# Patient Record
Sex: FEMALE | Race: WHITE | NOT HISPANIC OR LATINO | Employment: FULL TIME | RURAL
[De-identification: names, ages, dates, MRNs, and addresses within clinical notes are randomized per-mention and may not be internally consistent; named-entity substitution may affect disease eponyms.]

---

## 2018-07-19 ENCOUNTER — HISTORICAL (OUTPATIENT)
Dept: ADMINISTRATIVE | Facility: HOSPITAL | Age: 48
End: 2018-07-19

## 2018-07-20 LAB
LAB AP CLINICAL INFORMATION: NORMAL
LAB AP DIAGNOSIS - HISTORICAL: NORMAL
LAB AP GROSS PATHOLOGY - HISTORICAL: NORMAL
LAB AP SPECIMEN SUBMITTED - HISTORICAL: NORMAL

## 2019-01-16 ENCOUNTER — HISTORICAL (OUTPATIENT)
Dept: ADMINISTRATIVE | Facility: HOSPITAL | Age: 49
End: 2019-01-16

## 2019-01-18 LAB
LAB AP CLINICAL INFORMATION: NORMAL
LAB AP GENERAL CAT - HISTORICAL: NORMAL
LAB AP INTERPRETATION/RESULT - HISTORICAL: NEGATIVE
LAB AP SPECIMEN ADEQUACY - HISTORICAL: NORMAL
LAB AP SPECIMEN SUBMITTED - HISTORICAL: NORMAL

## 2019-07-15 ENCOUNTER — HISTORICAL (OUTPATIENT)
Dept: ADMINISTRATIVE | Facility: HOSPITAL | Age: 49
End: 2019-07-15

## 2020-03-09 ENCOUNTER — HISTORICAL (OUTPATIENT)
Dept: ADMINISTRATIVE | Facility: HOSPITAL | Age: 50
End: 2020-03-09

## 2020-09-07 ENCOUNTER — HISTORICAL (OUTPATIENT)
Dept: ADMINISTRATIVE | Facility: HOSPITAL | Age: 50
End: 2020-09-07

## 2020-09-25 ENCOUNTER — HISTORICAL (OUTPATIENT)
Dept: ADMINISTRATIVE | Facility: HOSPITAL | Age: 50
End: 2020-09-25

## 2020-09-25 LAB — SARS-COV+SARS-COV-2 AG RESP QL IA.RAPID: NEGATIVE

## 2022-02-17 ENCOUNTER — OFFICE VISIT (OUTPATIENT)
Dept: FAMILY MEDICINE | Facility: CLINIC | Age: 52
End: 2022-02-17
Payer: OTHER GOVERNMENT

## 2022-02-17 VITALS
HEART RATE: 88 BPM | DIASTOLIC BLOOD PRESSURE: 64 MMHG | RESPIRATION RATE: 18 BRPM | OXYGEN SATURATION: 99 % | HEIGHT: 70 IN | BODY MASS INDEX: 27.2 KG/M2 | WEIGHT: 190 LBS | SYSTOLIC BLOOD PRESSURE: 122 MMHG | TEMPERATURE: 97 F

## 2022-02-17 DIAGNOSIS — R09.81 HEAD CONGESTION: ICD-10-CM

## 2022-02-17 DIAGNOSIS — R05.9 COUGH: ICD-10-CM

## 2022-02-17 DIAGNOSIS — J32.0 MAXILLARY SINUSITIS, UNSPECIFIED CHRONICITY: Primary | ICD-10-CM

## 2022-02-17 LAB
CTP QC/QA: YES
FLUAV AG NPH QL: NEGATIVE
FLUBV AG NPH QL: NEGATIVE
SARS-COV-2 AG RESP QL IA.RAPID: NEGATIVE

## 2022-02-17 PROCEDURE — 87428 POCT SARS-COV2 (COVID) WITH FLU ANTIGEN: ICD-10-PCS | Mod: QW,,, | Performed by: NURSE PRACTITIONER

## 2022-02-17 PROCEDURE — 87428 SARSCOV & INF VIR A&B AG IA: CPT | Mod: QW,,, | Performed by: NURSE PRACTITIONER

## 2022-02-17 PROCEDURE — 96372 THER/PROPH/DIAG INJ SC/IM: CPT | Mod: ,,, | Performed by: NURSE PRACTITIONER

## 2022-02-17 PROCEDURE — 96372 PR INJECTION,THERAP/PROPH/DIAG2ST, IM OR SUBCUT: ICD-10-PCS | Mod: ,,, | Performed by: NURSE PRACTITIONER

## 2022-02-17 PROCEDURE — 99213 PR OFFICE/OUTPT VISIT, EST, LEVL III, 20-29 MIN: ICD-10-PCS | Mod: 25,,, | Performed by: NURSE PRACTITIONER

## 2022-02-17 PROCEDURE — 99213 OFFICE O/P EST LOW 20 MIN: CPT | Mod: 25,,, | Performed by: NURSE PRACTITIONER

## 2022-02-17 RX ORDER — FLUOXETINE 10 MG/1
1 TABLET ORAL DAILY
COMMUNITY
Start: 2021-10-28

## 2022-02-17 RX ORDER — NORGESTIMATE AND ETHINYL ESTRADIOL 0.25-0.035
1 KIT ORAL DAILY
COMMUNITY
Start: 2022-01-07

## 2022-02-17 RX ORDER — DEXAMETHASONE SODIUM PHOSPHATE 4 MG/ML
4 INJECTION, SOLUTION INTRA-ARTICULAR; INTRALESIONAL; INTRAMUSCULAR; INTRAVENOUS; SOFT TISSUE
Status: COMPLETED | OUTPATIENT
Start: 2022-02-17 | End: 2022-02-17

## 2022-02-17 RX ORDER — AZITHROMYCIN 250 MG/1
TABLET, FILM COATED ORAL
Qty: 6 TABLET | Refills: 0 | Status: SHIPPED | OUTPATIENT
Start: 2022-02-17 | End: 2022-02-22

## 2022-02-17 RX ORDER — FAMOTIDINE 20 MG/1
20 TABLET, FILM COATED ORAL 2 TIMES DAILY
COMMUNITY
Start: 2021-12-17 | End: 2022-08-24 | Stop reason: SDUPTHER

## 2022-02-17 RX ORDER — PANTOPRAZOLE SODIUM 40 MG/1
1 TABLET, DELAYED RELEASE ORAL DAILY
COMMUNITY
End: 2022-08-24 | Stop reason: SDUPTHER

## 2022-02-17 RX ORDER — CEFTRIAXONE 1 G/1
1 INJECTION, POWDER, FOR SOLUTION INTRAMUSCULAR; INTRAVENOUS
Status: COMPLETED | OUTPATIENT
Start: 2022-02-17 | End: 2022-02-17

## 2022-02-17 RX ADMIN — DEXAMETHASONE SODIUM PHOSPHATE 4 MG: 4 INJECTION, SOLUTION INTRA-ARTICULAR; INTRALESIONAL; INTRAMUSCULAR; INTRAVENOUS; SOFT TISSUE at 10:02

## 2022-02-17 RX ADMIN — CEFTRIAXONE 1 G: 1 INJECTION, POWDER, FOR SOLUTION INTRAMUSCULAR; INTRAVENOUS at 10:02

## 2022-02-17 NOTE — PROGRESS NOTES
ANNABELLE Borjas   [address] [address]  [phone]      PATIENT NAME: Tasia Soto  : 1970  DATE: 22  MRN: [id number]      Billing Provider: ANNABELLE Borjas  Level of Service:   Patient PCP Information     Provider PCP Type    ANNABELLE Ruiz General          Reason for Visit / Chief Complaint: Headache, Nasal Congestion, Cough, and Otalgia       Update PCP  Update Chief Complaint         History of Present Illness / Problem Focused Workflow       Presents with complaints of headache, head and nasal congestion, cough x 3-4 days      Review of Systems     Review of Systems   Constitutional: Positive for fatigue. Negative for chills and fever.   HENT: Positive for congestion and ear pain. Negative for ear discharge and sore throat.    Respiratory: Positive for cough. Negative for shortness of breath.    Cardiovascular: Negative for chest pain.   Gastrointestinal: Negative for abdominal pain, diarrhea and nausea.   Musculoskeletal: Negative for gait problem.   Neurological: Positive for headaches. Negative for dizziness and weakness.   Hematological: Negative for adenopathy.   Psychiatric/Behavioral: Negative for dysphoric mood. The patient is not nervous/anxious.        Medical / Social / Family History     Past Medical History:   Diagnosis Date    Endometriosis        Past Surgical History:   Procedure Laterality Date    OOPHORECTOMY Left     ROBOT-ASSISTED EXPLORATORY LAPAROSCOPY      TUBAL LIGATION         Social History  MsSilvina  reports that she has never smoked. She has never used smokeless tobacco. She reports that she does not drink alcohol and does not use drugs.    Family History  Ms.'s family history is not on file.    Medications and Allergies     Medications  No outpatient medications have been marked as taking for the 22 encounter (Office Visit) with ANNABELLE Borjas.     Current Facility-Administered  Medications for the 2/17/22 encounter (Office Visit) with Kelin SUMMERS ANNABELLE Nguyen   Medication Dose Route Frequency Provider Last Rate Last Admin    [COMPLETED] cefTRIAXone injection 1 g  1 g Intramuscular 1 time in Clinic/HOD Kelin NguyenANNABELLE   1 g at 02/17/22 1045    [COMPLETED] dexamethasone injection 4 mg  4 mg Intramuscular 1 time in Clinic/HOD Kelin NguyenANNABELLE   4 mg at 02/17/22 1045       Allergies  Review of patient's allergies indicates:  No Known Allergies    Physical Examination     Vitals:    02/17/22 0903   BP: 122/64   Pulse: 88   Resp: 18   Temp: 97 °F (36.1 °C)     Physical Exam  Constitutional:       General: She is not in acute distress.  HENT:      Head: Normocephalic.      Right Ear: Tympanic membrane normal.      Left Ear: Tympanic membrane normal.      Nose: Congestion present.      Mouth/Throat:      Mouth: Mucous membranes are moist.      Pharynx: No posterior oropharyngeal erythema.   Eyes:      Extraocular Movements: Extraocular movements intact.   Cardiovascular:      Rate and Rhythm: Normal rate.      Heart sounds: Normal heart sounds.   Pulmonary:      Effort: Pulmonary effort is normal. No respiratory distress.   Abdominal:      General: Bowel sounds are normal.      Palpations: Abdomen is soft.      Tenderness: There is abdominal tenderness.   Musculoskeletal:         General: Normal range of motion.      Cervical back: Normal range of motion.   Skin:     General: Skin is warm.   Neurological:      Mental Status: She is alert and oriented to person, place, and time.   Psychiatric:         Behavior: Behavior normal.           Imaging / Labs       Office Visit on 02/17/2022   Component Date Value Ref Range Status    SARS Coronavirus 2 Antigen 02/17/2022 Negative  Negative Final    Rapid Influenza A Ag 02/17/2022 Negative  Negative Final    Rapid Influenza B Ag 02/17/2022 Negative  Negative Final     Acceptable 02/17/2022 Yes   Final       Assessment and Plan  (including Health Maintenance)      Problem List  Smart Sets  Document Outside HM   :    Health Maintenance Due   Topic Date Due    Hepatitis C Screening  Never done    Lipid Panel  Never done    HIV Screening  Never done    TETANUS VACCINE  Never done    Mammogram  Never done    Cervical Cancer Screening  Never done    Colorectal Cancer Screening  Never done       Problem List Items Addressed This Visit        ENT    Maxillary sinusitis - Primary    Relevant Medications    azithromycin (Z-PATRIC) 250 MG tablet    cefTRIAXone injection 1 g (Completed)    dexamethasone injection 4 mg (Completed)      Other Visit Diagnoses     Cough        Relevant Medications    dexamethasone injection 4 mg (Completed)    Other Relevant Orders    POCT SARS-COV2 (COVID) with Flu Antigen (Completed)    Head congestion        Relevant Medications    dexamethasone injection 4 mg (Completed)         Will treat with decadron and rocephin due to severity of symptoms  zithromax to be taken at home  Follow up as needed    Health Maintenance Topics with due status: Not Due       Topic Last Completion Date    COVID-19 Vaccine 02/04/2022         Signature:  ANNABELLE Borjas  [address] [address]  [phone]    Date of encounter: 2/17/22

## 2022-02-17 NOTE — LETTER
February 17, 2022      [facility]  [address]  [place] [address]  Phone: [phone]  Fax: [phone]       Patient: Tasia Soto   YOB: 1970  Date of Visit: 02/17/2022    To Whom It May Concern:    Sherita Soto  was at [facility] on 02/17/2022. She has tested NEGATIVE for Covid-19. She may return to work/school on 02/17/2022 with no restrictions. If you have any questions or concerns, or if I can be of further assistance, please do not hesitate to contact me.    Sincerely,    Anastasiya Rivera RN

## 2022-07-18 ENCOUNTER — TELEPHONE (OUTPATIENT)
Dept: PRIMARY CARE CLINIC | Facility: CLINIC | Age: 52
End: 2022-07-18
Payer: OTHER GOVERNMENT

## 2022-07-18 ENCOUNTER — LAB VISIT (OUTPATIENT)
Dept: PRIMARY CARE CLINIC | Facility: CLINIC | Age: 52
End: 2022-07-18
Payer: OTHER GOVERNMENT

## 2022-07-18 DIAGNOSIS — Z11.52 ENCOUNTER FOR SCREENING FOR COVID-19: Primary | ICD-10-CM

## 2022-07-18 LAB — SARS-COV-2 RDRP RESP QL NAA+PROBE: NEGATIVE

## 2022-08-24 ENCOUNTER — OFFICE VISIT (OUTPATIENT)
Dept: FAMILY MEDICINE | Facility: CLINIC | Age: 52
End: 2022-08-24
Payer: OTHER GOVERNMENT

## 2022-08-24 VITALS
WEIGHT: 201 LBS | HEIGHT: 70 IN | DIASTOLIC BLOOD PRESSURE: 76 MMHG | SYSTOLIC BLOOD PRESSURE: 128 MMHG | OXYGEN SATURATION: 99 % | RESPIRATION RATE: 20 BRPM | TEMPERATURE: 98 F | HEART RATE: 79 BPM | BODY MASS INDEX: 28.77 KG/M2

## 2022-08-24 DIAGNOSIS — Z79.899 ENCOUNTER FOR LONG-TERM (CURRENT) USE OF MEDICATIONS: ICD-10-CM

## 2022-08-24 DIAGNOSIS — M54.50 LUMBAR PAIN WITH RADIATION DOWN LEFT LEG: Primary | ICD-10-CM

## 2022-08-24 DIAGNOSIS — J34.89 STUFFY AND RUNNY NOSE: ICD-10-CM

## 2022-08-24 DIAGNOSIS — Z12.11 SCREENING FOR COLORECTAL CANCER: ICD-10-CM

## 2022-08-24 DIAGNOSIS — J06.9 UPPER RESPIRATORY TRACT INFECTION, UNSPECIFIED TYPE: ICD-10-CM

## 2022-08-24 DIAGNOSIS — M79.605 LUMBAR PAIN WITH RADIATION DOWN LEFT LEG: Primary | ICD-10-CM

## 2022-08-24 DIAGNOSIS — K21.9 GASTROESOPHAGEAL REFLUX DISEASE WITHOUT ESOPHAGITIS: Primary | ICD-10-CM

## 2022-08-24 DIAGNOSIS — K21.9 GASTROESOPHAGEAL REFLUX DISEASE WITHOUT ESOPHAGITIS: ICD-10-CM

## 2022-08-24 DIAGNOSIS — Z13.220 SCREENING FOR HYPERCHOLESTEROLEMIA: ICD-10-CM

## 2022-08-24 DIAGNOSIS — Z12.12 SCREENING FOR COLORECTAL CANCER: ICD-10-CM

## 2022-08-24 DIAGNOSIS — R51.9 HEADACHE AROUND THE EYES: ICD-10-CM

## 2022-08-24 DIAGNOSIS — Z13.1 SCREENING FOR DIABETES MELLITUS: ICD-10-CM

## 2022-08-24 LAB
ALBUMIN SERPL BCP-MCNC: 4 G/DL (ref 3.5–5)
ALBUMIN/GLOB SERPL: 1.2 {RATIO}
ALP SERPL-CCNC: 54 U/L (ref 39–100)
ALT SERPL W P-5'-P-CCNC: 37 U/L (ref 13–56)
ANION GAP SERPL CALCULATED.3IONS-SCNC: 12 MMOL/L (ref 7–16)
AST SERPL W P-5'-P-CCNC: 17 U/L (ref 15–37)
BASOPHILS # BLD AUTO: 0.06 K/UL (ref 0–0.2)
BASOPHILS NFR BLD AUTO: 0.8 % (ref 0–1)
BILIRUB SERPL-MCNC: 1.3 MG/DL (ref 0–1.2)
BUN SERPL-MCNC: 11 MG/DL (ref 7–18)
BUN/CREAT SERPL: 19 (ref 6–20)
CALCIUM SERPL-MCNC: 8.9 MG/DL (ref 8.5–10.1)
CHLORIDE SERPL-SCNC: 103 MMOL/L (ref 98–107)
CHOLEST SERPL-MCNC: 172 MG/DL (ref 0–200)
CHOLEST/HDLC SERPL: 3.5 {RATIO}
CO2 SERPL-SCNC: 27 MMOL/L (ref 21–32)
CREAT SERPL-MCNC: 0.58 MG/DL (ref 0.55–1.02)
CTP QC/QA: YES
DIFFERENTIAL METHOD BLD: ABNORMAL
EGFR (NO RACE VARIABLE) (RUSH/TITUS): 111 ML/MIN/1.73M²
EOSINOPHIL # BLD AUTO: 0.1 K/UL (ref 0–0.5)
EOSINOPHIL NFR BLD AUTO: 1.3 % (ref 1–4)
ERYTHROCYTE [DISTWIDTH] IN BLOOD BY AUTOMATED COUNT: 12 % (ref 11.5–14.5)
FLUAV AG NPH QL: NEGATIVE
FLUBV AG NPH QL: NEGATIVE
GLOBULIN SER-MCNC: 3.3 G/DL (ref 2–4)
GLUCOSE SERPL-MCNC: 95 MG/DL (ref 74–106)
HCT VFR BLD AUTO: 38 % (ref 38–47)
HDLC SERPL-MCNC: 49 MG/DL (ref 40–60)
HGB BLD-MCNC: 13.2 G/DL (ref 12–16)
IMM GRANULOCYTES # BLD AUTO: 0.03 K/UL (ref 0–0.04)
IMM GRANULOCYTES NFR BLD: 0.4 % (ref 0–0.4)
LDLC SERPL CALC-MCNC: 88 MG/DL
LDLC/HDLC SERPL: 1.8 {RATIO}
LYMPHOCYTES # BLD AUTO: 2.65 K/UL (ref 1–4.8)
LYMPHOCYTES NFR BLD AUTO: 33.5 % (ref 27–41)
MCH RBC QN AUTO: 32.5 PG (ref 27–31)
MCHC RBC AUTO-ENTMCNC: 34.7 G/DL (ref 32–36)
MCV RBC AUTO: 93.6 FL (ref 80–96)
MONOCYTES # BLD AUTO: 0.74 K/UL (ref 0–0.8)
MONOCYTES NFR BLD AUTO: 9.4 % (ref 2–6)
MPC BLD CALC-MCNC: 11.2 FL (ref 9.4–12.4)
NEUTROPHILS # BLD AUTO: 4.32 K/UL (ref 1.8–7.7)
NEUTROPHILS NFR BLD AUTO: 54.6 % (ref 53–65)
NONHDLC SERPL-MCNC: 123 MG/DL
NRBC # BLD AUTO: 0 X10E3/UL
NRBC, AUTO (.00): 0 %
PLATELET # BLD AUTO: 309 K/UL (ref 150–400)
POTASSIUM SERPL-SCNC: 3.7 MMOL/L (ref 3.5–5.1)
PROT SERPL-MCNC: 7.3 G/DL (ref 6.4–8.2)
RBC # BLD AUTO: 4.06 M/UL (ref 4.2–5.4)
SARS-COV-2 AG RESP QL IA.RAPID: NEGATIVE
SODIUM SERPL-SCNC: 138 MMOL/L (ref 136–145)
TRIGL SERPL-MCNC: 177 MG/DL (ref 35–150)
VLDLC SERPL-MCNC: 35 MG/DL
WBC # BLD AUTO: 7.9 K/UL (ref 4.5–11)

## 2022-08-24 PROCEDURE — 80053 COMPREHEN METABOLIC PANEL: CPT | Mod: ,,, | Performed by: CLINICAL MEDICAL LABORATORY

## 2022-08-24 PROCEDURE — 99213 OFFICE O/P EST LOW 20 MIN: CPT | Mod: ,,, | Performed by: NURSE PRACTITIONER

## 2022-08-24 PROCEDURE — 80061 LIPID PANEL: ICD-10-PCS | Mod: ,,, | Performed by: CLINICAL MEDICAL LABORATORY

## 2022-08-24 PROCEDURE — 80061 LIPID PANEL: CPT | Mod: ,,, | Performed by: CLINICAL MEDICAL LABORATORY

## 2022-08-24 PROCEDURE — 85025 COMPLETE CBC W/AUTO DIFF WBC: CPT | Mod: ,,, | Performed by: CLINICAL MEDICAL LABORATORY

## 2022-08-24 PROCEDURE — 80053 COMPREHENSIVE METABOLIC PANEL: ICD-10-PCS | Mod: ,,, | Performed by: CLINICAL MEDICAL LABORATORY

## 2022-08-24 PROCEDURE — 87428 SARSCOV & INF VIR A&B AG IA: CPT | Mod: QW,,, | Performed by: NURSE PRACTITIONER

## 2022-08-24 PROCEDURE — 87428 POCT SARS-COV2 (COVID) WITH FLU ANTIGEN: ICD-10-PCS | Mod: QW,,, | Performed by: NURSE PRACTITIONER

## 2022-08-24 PROCEDURE — 99213 PR OFFICE/OUTPT VISIT, EST, LEVL III, 20-29 MIN: ICD-10-PCS | Mod: ,,, | Performed by: NURSE PRACTITIONER

## 2022-08-24 PROCEDURE — 85025 CBC WITH DIFFERENTIAL: ICD-10-PCS | Mod: ,,, | Performed by: CLINICAL MEDICAL LABORATORY

## 2022-08-24 RX ORDER — FAMOTIDINE 20 MG/1
20 TABLET, FILM COATED ORAL 2 TIMES DAILY
Qty: 90 TABLET | Refills: 1 | Status: SHIPPED | OUTPATIENT
Start: 2022-08-24 | End: 2022-08-24 | Stop reason: SDUPTHER

## 2022-08-24 RX ORDER — FAMOTIDINE 20 MG/1
20 TABLET, FILM COATED ORAL 2 TIMES DAILY
Qty: 180 TABLET | Refills: 1 | Status: SHIPPED | OUTPATIENT
Start: 2022-08-24

## 2022-08-24 RX ORDER — MELOXICAM 15 MG/1
15 TABLET ORAL NIGHTLY
Qty: 30 TABLET | Refills: 5 | Status: SHIPPED | OUTPATIENT
Start: 2022-08-24 | End: 2023-03-14

## 2022-08-24 RX ORDER — PANTOPRAZOLE SODIUM 40 MG/1
40 TABLET, DELAYED RELEASE ORAL DAILY
Qty: 90 TABLET | Refills: 1 | Status: SHIPPED | OUTPATIENT
Start: 2022-08-24

## 2022-08-24 RX ORDER — AZITHROMYCIN 250 MG/1
TABLET, FILM COATED ORAL
Qty: 6 TABLET | Refills: 0 | Status: SHIPPED | OUTPATIENT
Start: 2022-08-24 | End: 2022-11-07

## 2022-08-24 NOTE — PROGRESS NOTES
ANNABELLE Freeman   [facility]  [address]  [place] [address]     PATIENT NAME: Tasia Soto  : 1970  DATE: 22  MRN: [id number]      Billing Provider: ANNABELLE Freeman  Level of Service: MO OFFICE/OUTPT VISIT, EST, LEVL III, 20-29 MIN  Patient PCP Information     Provider PCP Type    ANNABELLE Ruiz General          Reason for Visit / Chief Complaint: Low-back Pain, Sinus Problem, Headache, leg numbness , and check a1c       Update PCP  Update Chief Complaint         History of Present Illness / Problem Focused Workflow     Tasia Soto presents to the clinic for refills on routine medications  Also having sinus drainage; denies fever, chills, headache or cough.  Hx of DDD with sciatica LLE with some numbness below her knee and mostly laterally. Also states the top of her foot feels tight with pain gary at night. Has been taking ibuprofen to help sleep. Would like to try physical therapy again and see if it helps. Has had injections in lower back per pain tx in the past.      Review of Systems     Review of Systems   Constitutional: Negative.  Negative for activity change, appetite change, chills, fatigue, fever and unexpected weight change.   HENT: Positive for nasal congestion, postnasal drip and sinus pressure/congestion. Negative for ear pain, sore throat, tinnitus and trouble swallowing.    Eyes: Negative for visual disturbance.   Respiratory: Negative for cough, chest tightness and shortness of breath.    Cardiovascular: Negative for chest pain and palpitations.   Gastrointestinal: Negative for abdominal pain, change in bowel habit, constipation, diarrhea, nausea, vomiting and change in bowel habit.   Endocrine: Negative for cold intolerance, heat intolerance, polydipsia, polyphagia and polyuria.   Genitourinary: Negative for difficulty urinating, frequency and urgency.   Musculoskeletal: Positive for back pain. Negative for gait problem.   Neurological:  "Negative for dizziness, weakness and headaches.        Medical / Social / Family History     Past Medical History:   Diagnosis Date    Endometriosis        Past Surgical History:   Procedure Laterality Date    HYSTERECTOMY      OOPHORECTOMY Left     ROBOT-ASSISTED EXPLORATORY LAPAROSCOPY      TUBAL LIGATION  2018       Social History  Ms.  reports that she has never smoked. She has never used smokeless tobacco. She reports that she does not drink alcohol and does not use drugs.    Family History  Ms.'s family history includes Diabetes type II in her mother.    Medications and Allergies     Medications  Outpatient Medications Marked as Taking for the 8/24/22 encounter (Office Visit) with ANNABELLE Randle   Medication Sig Dispense Refill    [DISCONTINUED] famotidine (PEPCID) 20 MG tablet Take 20 mg by mouth 2 (two) times daily.      [DISCONTINUED] pantoprazole (PROTONIX) 40 MG tablet Take 1 tablet by mouth once daily.         Allergies  Review of patient's allergies indicates:  No Known Allergies    Physical Examination     Vitals:    08/24/22 0934   BP: 128/76   BP Location: Left arm   Patient Position: Sitting   BP Method: Large (Manual)   Pulse: 79   Resp: 20   Temp: 97.8 °F (36.6 °C)   TempSrc: Temporal   SpO2: 99%   Weight: 91.2 kg (201 lb)   Height: 5' 10" (1.778 m)      Physical Exam  Constitutional:       Appearance: Normal appearance.   HENT:      Head: Normocephalic.      Right Ear: Hearing and ear canal normal. No tenderness. Tympanic membrane is not injected.      Left Ear: Hearing and ear canal normal. No tenderness. Tympanic membrane is not injected.      Nose: Congestion and rhinorrhea present. No nasal deformity.      Right Turbinates: Not swollen.      Left Turbinates: Not swollen.      Right Sinus: No maxillary sinus tenderness or frontal sinus tenderness.      Left Sinus: No maxillary sinus tenderness or frontal sinus tenderness.      Mouth/Throat:      Lips: Pink.      Mouth: Mucous " membranes are moist.      Pharynx: No pharyngeal swelling or oropharyngeal exudate.      Tonsils: No tonsillar exudate.      Comments: Posterior pharynx injected  Eyes:      General: Lids are normal. No allergic shiner.        Right eye: No discharge.         Left eye: No discharge.      Conjunctiva/sclera:      Right eye: Right conjunctiva is not injected.      Left eye: Left conjunctiva is not injected.   Neck:      Trachea: Trachea normal.   Cardiovascular:      Rate and Rhythm: Normal rate and regular rhythm.      Pulses:           Carotid pulses are 3+ on the right side and 3+ on the left side.       Dorsalis pedis pulses are 3+ on the right side and 3+ on the left side.        Posterior tibial pulses are 3+ on the right side and 3+ on the left side.      Heart sounds: Normal heart sounds.   Pulmonary:      Effort: Pulmonary effort is normal. No respiratory distress.      Breath sounds: Normal breath sounds.   Abdominal:      General: Bowel sounds are normal. There is no distension.      Palpations: Abdomen is soft.      Tenderness: There is no abdominal tenderness.   Musculoskeletal:      Cervical back: Neck supple.      Lumbar back: Bony tenderness present. Positive left straight leg raise test.      Right lower leg: No edema.      Left lower leg: No edema.   Lymphadenopathy:      Cervical: Cervical adenopathy present.   Skin:     General: Skin is warm and dry.      Capillary Refill: Capillary refill takes less than 2 seconds.   Neurological:      Mental Status: She is alert.          Assessment and Plan (including Health Maintenance)      Problem List  Smart Sets  Document Outside HM   :          Health Maintenance Due   Topic Date Due    Lipid Panel  Never done    Colorectal Cancer Screening  Never done       Problem List Items Addressed This Visit    None     Visit Diagnoses     Lumbar pain with radiation down left leg    -  Primary    Will refer to physical therapy and try mobic at bedtime.    Relevant  Medications    meloxicam (MOBIC) 15 MG tablet    Other Relevant Orders    Ambulatory referral/consult to Physical/Occupational Therapy    Screening for colorectal cancer        Relevant Orders    Ambulatory referral/consult to Gastroenterology    Screening for diabetes mellitus        Screening for hypercholesterolemia        Relevant Orders    Comprehensive Metabolic Panel    Lipid Panel    Stuffy and runny nose        Relevant Orders    POCT SARS-COV2 (COVID) with Flu Antigen (Completed)    CBC Auto Differential    Headache around the eyes        Relevant Orders    POCT SARS-COV2 (COVID) with Flu Antigen (Completed)    CBC Auto Differential    Gastroesophageal reflux disease without esophagitis        Continue current medication and diet    Relevant Medications    famotidine (PEPCID) 20 MG tablet    pantoprazole (PROTONIX) 40 MG tablet    Upper respiratory tract infection, unspecified type        Will treat with zithromax for 5 days. COVID/influenza negative    Relevant Medications    azithromycin (ZITHROMAX Z-PATRIC) 250 MG tablet    Encounter for long-term (current) use of medications        CBC and CMP ordered.    Relevant Orders    Comprehensive Metabolic Panel    CBC Auto Differential        Lumbar pain with radiation down left leg  Comments:  Will refer to physical therapy and try mobic at bedtime.  Orders:  -     Ambulatory referral/consult to Physical/Occupational Therapy; Future; Expected date: 08/31/2022  -     meloxicam (MOBIC) 15 MG tablet; Take 1 tablet (15 mg total) by mouth nightly.  Dispense: 30 tablet; Refill: 5    Screening for colorectal cancer  -     Ambulatory referral/consult to Gastroenterology; Future; Expected date: 08/31/2022    Screening for diabetes mellitus    Screening for hypercholesterolemia  -     Comprehensive Metabolic Panel; Future; Expected date: 08/24/2022  -     Lipid Panel; Future; Expected date: 08/24/2022    Stuffy and runny nose  -     POCT SARS-COV2 (COVID) with Flu  Antigen  -     CBC Auto Differential; Future; Expected date: 08/24/2022    Headache around the eyes  -     POCT SARS-COV2 (COVID) with Flu Antigen  -     CBC Auto Differential; Future; Expected date: 08/24/2022    Gastroesophageal reflux disease without esophagitis  Comments:  Continue current medication and diet  Orders:  -     famotidine (PEPCID) 20 MG tablet; Take 1 tablet (20 mg total) by mouth 2 (two) times daily.  Dispense: 90 tablet; Refill: 1  -     pantoprazole (PROTONIX) 40 MG tablet; Take 1 tablet (40 mg total) by mouth once daily.  Dispense: 90 tablet; Refill: 1    Upper respiratory tract infection, unspecified type  Comments:  Will treat with zithromax for 5 days. COVID/influenza negative  Orders:  -     azithromycin (ZITHROMAX Z-PATRIC) 250 MG tablet; Take 2 tabs by mouth today then 1 tab daily x 4 days  Dispense: 6 tablet; Refill: 0    Encounter for long-term (current) use of medications  Comments:  CBC and CMP ordered.  Orders:  -     Comprehensive Metabolic Panel; Future; Expected date: 08/24/2022  -     CBC Auto Differential; Future; Expected date: 08/24/2022       Health Maintenance Topics with due status: Not Due       Topic Last Completion Date    Influenza Vaccine Not Due       Procedures          Follow up in about 6 months (around 2/24/2023), or if symptoms worsen or fail to improve.     Signature:  ANNABELLE Freeman    Date of encounter: 8/24/22

## 2022-10-17 LAB — CRC RECOMMENDATION EXT: NORMAL

## 2022-11-02 ENCOUNTER — PATIENT OUTREACH (OUTPATIENT)
Dept: FAMILY MEDICINE | Facility: CLINIC | Age: 52
End: 2022-11-02
Payer: OTHER GOVERNMENT

## 2022-11-07 ENCOUNTER — OFFICE VISIT (OUTPATIENT)
Dept: FAMILY MEDICINE | Facility: CLINIC | Age: 52
End: 2022-11-07
Payer: OTHER GOVERNMENT

## 2022-11-07 VITALS
RESPIRATION RATE: 20 BRPM | BODY MASS INDEX: 29.4 KG/M2 | TEMPERATURE: 98 F | HEART RATE: 73 BPM | SYSTOLIC BLOOD PRESSURE: 136 MMHG | DIASTOLIC BLOOD PRESSURE: 94 MMHG | OXYGEN SATURATION: 98 % | WEIGHT: 205.38 LBS | HEIGHT: 70 IN

## 2022-11-07 DIAGNOSIS — L23.7 POISON IVY DERMATITIS: ICD-10-CM

## 2022-11-07 DIAGNOSIS — J01.40 ACUTE NON-RECURRENT PANSINUSITIS: Primary | ICD-10-CM

## 2022-11-07 DIAGNOSIS — R52 BODY ACHES: ICD-10-CM

## 2022-11-07 PROCEDURE — 99213 OFFICE O/P EST LOW 20 MIN: CPT | Mod: 25,,, | Performed by: NURSE PRACTITIONER

## 2022-11-07 PROCEDURE — 99213 PR OFFICE/OUTPT VISIT, EST, LEVL III, 20-29 MIN: ICD-10-PCS | Mod: 25,,, | Performed by: NURSE PRACTITIONER

## 2022-11-07 PROCEDURE — 96372 THER/PROPH/DIAG INJ SC/IM: CPT | Mod: ,,, | Performed by: NURSE PRACTITIONER

## 2022-11-07 PROCEDURE — 87428 SARSCOV & INF VIR A&B AG IA: CPT | Mod: QW,,, | Performed by: NURSE PRACTITIONER

## 2022-11-07 PROCEDURE — 87428 POCT SARS-COV2 (COVID) WITH FLU ANTIGEN: ICD-10-PCS | Mod: QW,,, | Performed by: NURSE PRACTITIONER

## 2022-11-07 PROCEDURE — 96372 PR INJECTION,THERAP/PROPH/DIAG2ST, IM OR SUBCUT: ICD-10-PCS | Mod: ,,, | Performed by: NURSE PRACTITIONER

## 2022-11-07 RX ORDER — CEFTRIAXONE 1 G/1
1 INJECTION, POWDER, FOR SOLUTION INTRAMUSCULAR; INTRAVENOUS
Status: COMPLETED | OUTPATIENT
Start: 2022-11-07 | End: 2022-11-07

## 2022-11-07 RX ORDER — AZITHROMYCIN 250 MG/1
TABLET, FILM COATED ORAL
Qty: 6 TABLET | Refills: 0 | Status: SHIPPED | OUTPATIENT
Start: 2022-11-07 | End: 2022-11-12

## 2022-11-07 RX ORDER — CLOBETASOL PROPIONATE 0.5 MG/G
CREAM TOPICAL 2 TIMES DAILY
Qty: 60 G | Refills: 1 | Status: SHIPPED | OUTPATIENT
Start: 2022-11-07

## 2022-11-07 RX ORDER — METHYLPREDNISOLONE ACETATE 40 MG/ML
40 INJECTION, SUSPENSION INTRA-ARTICULAR; INTRALESIONAL; INTRAMUSCULAR; SOFT TISSUE
Status: COMPLETED | OUTPATIENT
Start: 2022-11-07 | End: 2022-11-07

## 2022-11-07 RX ADMIN — METHYLPREDNISOLONE ACETATE 40 MG: 40 INJECTION, SUSPENSION INTRA-ARTICULAR; INTRALESIONAL; INTRAMUSCULAR; SOFT TISSUE at 12:11

## 2022-11-07 RX ADMIN — CEFTRIAXONE 1 G: 1 INJECTION, POWDER, FOR SOLUTION INTRAMUSCULAR; INTRAVENOUS at 12:11

## 2022-11-07 NOTE — LETTER
November 7, 2022      [facility]  [address]  [place] [address]  Phone: [phone]  Fax: [phone]       Patient: Tasia Soto   YOB: 1970  Date of Visit: 11/07/2022    To Whom It May Concern:    Sherita Soto  was at [facility] on 11/07/2022. The patient may return to work/school on 11/08/2022 with no restrictions. If you have any questions or concerns, or if I can be of further assistance, please do not hesitate to contact me.    Sincerely,    Angelita Wiseman RN

## 2022-11-07 NOTE — PROGRESS NOTES
Lesa Mills NP   [place] [id number]  [place]  [address]      PATIENT NAME: Tasia Soto  : 1970  DATE: 22  MRN: [id number]      Billing Provider: Lesa Mills NP  Level of Service: [facility] OFFICE/OUTPT VISIT, EST, LEVL III, 20-29 MIN  Patient PCP Information       Provider PCP Type    [facility]            Reason for Visit / Chief Complaint: Cough (Started yesterday ), Fatigue (X3 days ), Nasal Congestion (Started yesterday), and Generalized Body Aches (X2 days )       Update PCP  Update Chief Complaint         History of Present Illness / Problem Focused Workflow     Tasia Soto presents to the clinic with Cough (Started yesterday ), Fatigue (X3 days ), Nasal Congestion (Started yesterday), and Generalized Body Aches (X2 days )     51 year old female presents to clinic with complaints of Cough, Fatigue, Nasal Congestion, and Generalized Body Aches that started yesterday. She denies fever. Reports she has been exposed to flu positive contact but thinks hers is just sinuses. Patient has additional complaint of rash to bilat forearms. 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     Review of Systems     @Review of Systems   Constitutional:  Positive for fatigue. Negative for activity change, appetite change and fever.   HENT:  Positive for nasal congestion, rhinorrhea and sinus pressure/congestion. Negative for ear pain and sore throat.    Eyes:  Negative for pain, redness, visual disturbance and eye dryness.   Respiratory:  Positive for cough. Negative for shortness of breath.    Cardiovascular:  Negative for chest pain and leg swelling.   Gastrointestinal:  Negative for abdominal distention, abdominal pain, constipation and diarrhea.   Endocrine: Negative for cold intolerance, heat intolerance and polyuria.   Genitourinary:  Negative for bladder incontinence, dysuria, frequency and urgency.    Musculoskeletal:  Positive for myalgias. Negative for arthralgias and gait problem.   Integumentary:  Positive for rash. Negative for color change and wound.   Allergic/Immunologic: Negative for environmental allergies and food allergies.   Neurological:  Negative for dizziness, weakness, light-headedness and headaches.   Psychiatric/Behavioral:  Negative for behavioral problems and sleep disturbance.      Medical / Social / Family History     Past Medical History:   Diagnosis Date    Endometriosis        Past Surgical History:   Procedure Laterality Date    HYSTERECTOMY      OOPHORECTOMY Left     ROBOT-ASSISTED EXPLORATORY LAPAROSCOPY      TUBAL LIGATION  2018       Social History  Ms.  reports that she has never smoked. She has never used smokeless tobacco. She reports that she does not drink alcohol and does not use drugs.    Family History  Ms.'s family history includes Diabetes type II in her mother.    Medications and Allergies     Medications  Outpatient Medications Marked as Taking for the 11/7/22 encounter (Office Visit) with Lesa Mills NP   Medication Sig Dispense Refill    famotidine (PEPCID) 20 MG tablet Take 1 tablet (20 mg total) by mouth 2 (two) times daily. 180 tablet 1    meloxicam (MOBIC) 15 MG tablet Take 1 tablet (15 mg total) by mouth nightly. 30 tablet 5    pantoprazole (PROTONIX) 40 MG tablet Take 1 tablet (40 mg total) by mouth once daily. 90 tablet 1       Allergies  Review of patient's allergies indicates:  No Known Allergies    Physical Examination     Vitals:    11/07/22 1135   BP: (!) 136/94   Pulse: 73   Resp: 20   Temp: 98 °F (36.7 °C)     Physical Exam  Vitals and nursing note reviewed.   Constitutional:       Appearance: Normal appearance.   HENT:      Head: Normocephalic.      Right Ear: Tympanic membrane normal.      Left Ear: Tympanic membrane normal.      Nose: Congestion and rhinorrhea present. Rhinorrhea is purulent.      Right Turbinates: Pale.      Left Turbinates:  Pale.      Right Sinus: Maxillary sinus tenderness and frontal sinus tenderness present.      Left Sinus: Maxillary sinus tenderness and frontal sinus tenderness present.      Mouth/Throat:      Lips: Pink.      Mouth: Mucous membranes are moist.      Pharynx: Oropharyngeal exudate (clear post nasal drainage.) and posterior oropharyngeal erythema present.   Eyes:      Conjunctiva/sclera: Conjunctivae normal.   Cardiovascular:      Rate and Rhythm: Normal rate and regular rhythm.      Pulses: Normal pulses.      Heart sounds: Normal heart sounds.   Pulmonary:      Effort: Pulmonary effort is normal.      Breath sounds: Normal breath sounds. No wheezing or rhonchi.   Abdominal:      General: Abdomen is flat. Bowel sounds are normal. There is no distension.      Palpations: Abdomen is soft.      Tenderness: There is no abdominal tenderness.   Musculoskeletal:         General: Normal range of motion.      Cervical back: Normal range of motion.   Skin:     General: Skin is warm and dry.      Capillary Refill: Capillary refill takes less than 2 seconds.      Findings: Rash present. Rash is urticarial.      Comments: Red raised highly pruritic rash to bilat forearms.    Neurological:      General: No focal deficit present.      Mental Status: She is alert and oriented to person, place, and time. Mental status is at baseline.   Psychiatric:         Mood and Affect: Mood normal.         Behavior: Behavior normal.             Lab Results   Component Value Date    WBC 7.90 08/24/2022    HGB 13.2 08/24/2022    HCT 38.0 08/24/2022    MCV 93.6 08/24/2022     08/24/2022          Sodium   Date Value Ref Range Status   08/24/2022 138 136 - 145 mmol/L Final     Potassium   Date Value Ref Range Status   08/24/2022 3.7 3.5 - 5.1 mmol/L Final     Chloride   Date Value Ref Range Status   08/24/2022 103 98 - 107 mmol/L Final     CO2   Date Value Ref Range Status   08/24/2022 27 21 - 32 mmol/L Final     Glucose   Date Value Ref  Range Status   08/24/2022 95 74 - 106 mg/dL Final     BUN   Date Value Ref Range Status   08/24/2022 11 7 - 18 mg/dL Final     Creatinine   Date Value Ref Range Status   08/24/2022 0.58 0.55 - 1.02 mg/dL Final     Calcium   Date Value Ref Range Status   08/24/2022 8.9 8.5 - 10.1 mg/dL Final     Total Protein   Date Value Ref Range Status   08/24/2022 7.3 6.4 - 8.2 g/dL Final     Albumin   Date Value Ref Range Status   08/24/2022 4.0 3.5 - 5.0 g/dL Final     Bilirubin, Total   Date Value Ref Range Status   08/24/2022 1.3 (H) 0.0 - 1.2 mg/dL Final     Alk Phos   Date Value Ref Range Status   08/24/2022 54 39 - 100 U/L Final     AST   Date Value Ref Range Status   08/24/2022 17 15 - 37 U/L Final     ALT   Date Value Ref Range Status   08/24/2022 37 13 - 56 U/L Final     Anion Gap   Date Value Ref Range Status   08/24/2022 12 7 - 16 mmol/L Final      MRI Lumbar Spine Without Contrast  Narrative: EXAMINATION:  MRI LUMBAR SPINE WITHOUT CONTRAST    CLINICAL HISTORY:  Low back pain    TECHNIQUE:  Multiplanar, multisequence MR images were acquired from the thoracolumbar junction to the sacrum without the administration of contrast.    COMPARISON:  MRI 01/13/2020    FINDINGS:  The vertebral body heights are maintained.  Mild degenerative endplate changes throughout.  There is minimal anterolisthesis L4 on L5 that is unchanged.  Disc desiccation throughout.  Conus terminates at the L1 level.    L1-L2: No spinal canal or foraminal stenosis.    L2-L3: Disc bulge and facet degeneration.  No spinal canal or foraminal stenosis.    L3-4: Disc bulge and facet degeneration.  No spinal canal or foraminal stenosis.    L4-5: Disc bulge and facet degeneration.  No spinal canal stenosis.  Mild bilateral foraminal stenosis.    L5-S1: Disc bulging diffusely but no spinal canal stenosis.  Moderate left and mild right foraminal stenosis.  Impression: Degenerative disc and facet changes of the lumbar spine similar to prior  exam.    Electronically signed by: Kilo Loco  Date:    07/22/2021  Time:    13:39     Procedures   Assessment and Plan (including Health Maintenance)      Problem List  Smart Sets  Document Outside HM   :    Plan:           Problem List Items Addressed This Visit          ENT    Acute non-recurrent pansinusitis - Primary    Current Assessment & Plan     Rocephin and Depomedrol given IM in clinic.   Zithromax as ordered and Ed a hist as needed.    Reviewed pathology of current symptoms, medication side effects/risk/benefits/directions on taking medications, and worsening or persistent symptoms that require follow up in next 2-3 days. Saline/steroid nasal sprays, antihistamine use, increase fluid intake, and multivitamin/elderberry/Zinc use were recommended. May take Tylenol or Motrin as needed for pain and/or fever. Patient was instructed to take antibiotic as directed, complete entire course to avoid antibiotic resistance, and take OTC probiotic with antibiotic to prevent GI upset. Patient verbalized understanding of treatment plan and denies any questions.         Relevant Medications    chlorpheniramine-phenylephrine 4-10 mg per tablet    clobetasoL (TEMOVATE) 0.05 % cream       Other    Poison ivy dermatitis    Current Assessment & Plan     Reviewed pathology of current symptoms, treatment plan, medication side effects/risk/benefits/directions on taking medications, instructed to alternate OTC Tylenol/Motrin for fever/pain, keep areas clean and dry, wash with mild soaps and pat dry, oatmeal baths. Discussed the pros/cons of steroids, and patient wishes to proceed. Discussed H1/H2 use. Patient verbalized understanding of treatment plan and denies any questions.              Other Visit Diagnoses       Body aches                Health Maintenance Topics with due status: Not Due       Topic Last Completion Date    Lipid Panel 08/24/2022    Colorectal Cancer Screening 10/17/2022       No future appointments.      Health Maintenance Due   Topic Date Due    Hepatitis C Screening  Never done    Mammogram  Never done    Influenza Vaccine (1) Never done        No follow-ups on file.     Signature:  Lesa Mills NP  [place] [id number]  [address]    Date of encounter: 11/7/22

## 2022-11-08 RX ORDER — OSELTAMIVIR PHOSPHATE 75 MG/1
75 CAPSULE ORAL 2 TIMES DAILY
Qty: 10 CAPSULE | Refills: 0 | Status: SHIPPED | OUTPATIENT
Start: 2022-11-08 | End: 2022-11-13

## 2022-11-14 PROBLEM — J01.40 ACUTE NON-RECURRENT PANSINUSITIS: Status: ACTIVE | Noted: 2022-11-14

## 2022-11-14 PROBLEM — L23.7 POISON IVY DERMATITIS: Status: ACTIVE | Noted: 2022-11-14

## 2022-11-14 NOTE — ASSESSMENT & PLAN NOTE
Reviewed pathology of current symptoms, treatment plan, medication side effects/risk/benefits/directions on taking medications, instructed to alternate OTC Tylenol/Motrin for fever/pain, keep areas clean and dry, wash with mild soaps and pat dry, oatmeal baths. Discussed the pros/cons of steroids, and patient wishes to proceed. Discussed H1/H2 use. Patient verbalized understanding of treatment plan and denies any questions.

## 2022-11-14 NOTE — ASSESSMENT & PLAN NOTE
Rocephin and Depomedrol given IM in clinic.   Zithromax as ordered and Ed a hist as needed.    Reviewed pathology of current symptoms, medication side effects/risk/benefits/directions on taking medications, and worsening or persistent symptoms that require follow up in next 2-3 days. Saline/steroid nasal sprays, antihistamine use, increase fluid intake, and multivitamin/elderberry/Zinc use were recommended. May take Tylenol or Motrin as needed for pain and/or fever. Patient was instructed to take antibiotic as directed, complete entire course to avoid antibiotic resistance, and take OTC probiotic with antibiotic to prevent GI upset. Patient verbalized understanding of treatment plan and denies any questions.

## 2023-02-13 PROBLEM — J01.40 ACUTE NON-RECURRENT PANSINUSITIS: Status: RESOLVED | Noted: 2022-11-14 | Resolved: 2023-02-13

## 2023-03-10 ENCOUNTER — OFFICE VISIT (OUTPATIENT)
Dept: FAMILY MEDICINE | Facility: CLINIC | Age: 53
End: 2023-03-10
Payer: OTHER GOVERNMENT

## 2023-03-10 VITALS
SYSTOLIC BLOOD PRESSURE: 122 MMHG | TEMPERATURE: 99 F | HEIGHT: 70 IN | HEART RATE: 75 BPM | WEIGHT: 198.81 LBS | OXYGEN SATURATION: 97 % | DIASTOLIC BLOOD PRESSURE: 81 MMHG | BODY MASS INDEX: 28.46 KG/M2

## 2023-03-10 DIAGNOSIS — J06.9 UPPER RESPIRATORY TRACT INFECTION, UNSPECIFIED TYPE: Primary | ICD-10-CM

## 2023-03-10 PROCEDURE — 96372 PR INJECTION,THERAP/PROPH/DIAG2ST, IM OR SUBCUT: ICD-10-PCS | Mod: ,,, | Performed by: FAMILY MEDICINE

## 2023-03-10 PROCEDURE — 99214 PR OFFICE/OUTPT VISIT, EST, LEVL IV, 30-39 MIN: ICD-10-PCS | Mod: 25,,, | Performed by: FAMILY MEDICINE

## 2023-03-10 PROCEDURE — 96372 THER/PROPH/DIAG INJ SC/IM: CPT | Mod: ,,, | Performed by: FAMILY MEDICINE

## 2023-03-10 PROCEDURE — 99214 OFFICE O/P EST MOD 30 MIN: CPT | Mod: 25,,, | Performed by: FAMILY MEDICINE

## 2023-03-10 RX ORDER — CEFTRIAXONE 1 G/1
1 INJECTION, POWDER, FOR SOLUTION INTRAMUSCULAR; INTRAVENOUS
Status: COMPLETED | OUTPATIENT
Start: 2023-03-10 | End: 2023-03-10

## 2023-03-10 RX ORDER — AZITHROMYCIN 250 MG/1
TABLET, FILM COATED ORAL
Qty: 6 TABLET | Refills: 0 | Status: SHIPPED | OUTPATIENT
Start: 2023-03-10 | End: 2023-03-15

## 2023-03-10 RX ORDER — DEXBROMPHENIRAMINE MALEATE, DEXTROMETHORPHAN HBR, PHENYLEPHRINE HCL 2; 15; 7.5 MG/5ML; MG/5ML; MG/5ML
5 LIQUID ORAL 3 TIMES DAILY PRN
Qty: 120 ML | Refills: 0 | Status: SHIPPED | OUTPATIENT
Start: 2023-03-10

## 2023-03-10 RX ORDER — BETAMETHASONE SODIUM PHOSPHATE AND BETAMETHASONE ACETATE 3; 3 MG/ML; MG/ML
9 INJECTION, SUSPENSION INTRA-ARTICULAR; INTRALESIONAL; INTRAMUSCULAR; SOFT TISSUE
Status: COMPLETED | OUTPATIENT
Start: 2023-03-10 | End: 2023-03-10

## 2023-03-10 RX ADMIN — BETAMETHASONE SODIUM PHOSPHATE AND BETAMETHASONE ACETATE 9 MG: 3; 3 INJECTION, SUSPENSION INTRA-ARTICULAR; INTRALESIONAL; INTRAMUSCULAR; SOFT TISSUE at 02:03

## 2023-03-10 RX ADMIN — CEFTRIAXONE 1 G: 1 INJECTION, POWDER, FOR SOLUTION INTRAMUSCULAR; INTRAVENOUS at 02:03

## 2023-03-10 NOTE — PROGRESS NOTES
Instructed to wait 15 minutes to monitor for any adverse reaction, verbal understanding given, tolerated well.

## 2023-03-10 NOTE — PROGRESS NOTES
Kyle Valderrama MD    [address] [place] [address]     PATIENT NAME: Tasia Soto  : 1970  DATE: 3/10/23  MRN: [id number]      Billing Provider: Kyle Valderrama MD  Level of Service: [facility] OFFICE/OUTPT VISIT, EST, LEVL IV, 30-39 MIN  Patient PCP Information       Provider PCP Type    [facility]            Reason for Visit / Chief Complaint: Sinus Problem (Pt c/o sinus drainage, a little out of breath, and a dry cough that has been going on since Saturday. She states she has had no fever and she has been taking flonase and mucinex. ) and Toe Pain (She also wants to see if you will look at her third toe on her right foot. She stated she stumped it about a month ago. It has continued to swell almost daily and she is not sure what is going on with it. )       Update PCP  Update Chief Complaint         History of Present Illness / Problem Focused Workflow     Tasia Soto presents to the clinic with Sinus Problem (Pt c/o sinus drainage, a little out of breath, and a dry cough that has been going on since Saturday. She states she has had no fever and she has been taking flonase and mucinex. ) and Toe Pain (She also wants to see if you will look at her third toe on her right foot. She stated she stumped it about a month ago. It has continued to swell almost daily and she is not sure what is going on with it. )         HPI    Review of Systems     Review of Systems   Constitutional:  Positive for fatigue. Negative for activity change, appetite change, chills and fever.   HENT:  Positive for nasal congestion, rhinorrhea and sinus pressure/congestion. Negative for ear pain, hearing loss, postnasal drip and sore throat.    Respiratory:  Positive for cough. Negative for chest tightness, shortness of breath and wheezing.    Cardiovascular:  Negative for chest pain, palpitations, leg swelling and claudication.   Gastrointestinal:  Negative for  abdominal pain, change in bowel habit, constipation, diarrhea, nausea, vomiting and change in bowel habit.   Genitourinary:  Negative for dysuria.   Musculoskeletal:  Negative for arthralgias, back pain, gait problem and myalgias.   Integumentary:  Negative for rash.   Neurological:  Negative for weakness and headaches.   Psychiatric/Behavioral:  Negative for suicidal ideas. The patient is not nervous/anxious.       Medical / Social / Family History     Past Medical History:   Diagnosis Date    Endometriosis        Past Surgical History:   Procedure Laterality Date    HYSTERECTOMY      OOPHORECTOMY Left     ROBOT-ASSISTED EXPLORATORY LAPAROSCOPY      TUBAL LIGATION  2018       Social History  Ms.  reports that she has never smoked. She has never used smokeless tobacco. She reports that she does not drink alcohol and does not use drugs.    Family History  Ms.'s family history includes Diabetes type II in her mother.    Medications and Allergies     Medications  Outpatient Medications Marked as Taking for the 3/10/23 encounter (Office Visit) with Kyle Valderrama MD   Medication Sig Dispense Refill    clobetasoL (TEMOVATE) 0.05 % cream Apply topically 2 (two) times daily. 60 g 1    famotidine (PEPCID) 20 MG tablet Take 1 tablet (20 mg total) by mouth 2 (two) times daily. 180 tablet 1    pantoprazole (PROTONIX) 40 MG tablet Take 1 tablet (40 mg total) by mouth once daily. 90 tablet 1       Allergies  Review of patient's allergies indicates:  No Known Allergies    Physical Examination     Vitals:    03/10/23 1304   BP: 122/81   Pulse: 75   Temp: 98.9 °F (37.2 °C)     Physical Exam  Vitals and nursing note reviewed.   Constitutional:       General: She is not in acute distress.     Appearance: Normal appearance. She is not ill-appearing.   HENT:      Right Ear: Tympanic membrane, ear canal and external ear normal.      Left Ear: Tympanic membrane, ear canal and external ear normal.      Nose: Congestion and rhinorrhea  present.      Right Turbinates: Swollen.      Left Turbinates: Swollen.      Mouth/Throat:      Pharynx: Posterior oropharyngeal erythema present. No pharyngeal swelling or oropharyngeal exudate.   Eyes:      Extraocular Movements: Extraocular movements intact.      Pupils: Pupils are equal, round, and reactive to light.   Cardiovascular:      Rate and Rhythm: Normal rate and regular rhythm.      Heart sounds: Normal heart sounds.   Pulmonary:      Effort: Pulmonary effort is normal.      Breath sounds: Normal breath sounds.   Abdominal:      General: Bowel sounds are normal.      Palpations: Abdomen is soft.   Musculoskeletal:         General: Normal range of motion.   Lymphadenopathy:      Cervical: No cervical adenopathy.   Skin:     Findings: No rash.   Neurological:      General: No focal deficit present.      Mental Status: She is alert and oriented to person, place, and time. Mental status is at baseline.   Psychiatric:         Mood and Affect: Mood normal.         Behavior: Behavior normal.          Assessment and Plan (including Health Maintenance)      Problem List  Smart [place] Navigation  Document Outside HM   :    Plan:         Health Maintenance Due   Topic Date Due    Hepatitis C Screening  Never done    Mammogram  Never done    Hemoglobin A1c (Diabetic Prevention Screening)  Never done    Influenza Vaccine (1) Never done    Shingles Vaccine (1 of 2) Never done       Problem List Items Addressed This Visit          ENT    Upper respiratory tract infection - Primary    Relevant Medications    dexbrompheniramine-phenylep-DM (POLYTUSSIN DM,DEXBROMPHENIRMN,) 2-7.5-15 mg/5 mL Liqd    azithromycin (Z-PATRIC) 250 MG tablet       Health Maintenance Topics with due status: Not Due       Topic Last Completion Date    Lipid Panel 08/24/2022    Colorectal Cancer Screening 10/17/2022       Procedures     No future appointments.       Follow up if symptoms worsen or fail to improve.     Signature:  MD Richard Ellison  [address] [place] [address]  Phone #: [phone]  Fax #: [phone]    Date of encounter: 3/10/23    There are no Patient Instructions on file for this visit.

## 2023-03-14 PROBLEM — J06.9 UPPER RESPIRATORY TRACT INFECTION: Status: ACTIVE | Noted: 2023-03-14

## 2023-12-22 ENCOUNTER — OFFICE VISIT (OUTPATIENT)
Dept: FAMILY MEDICINE | Facility: CLINIC | Age: 53
End: 2023-12-22
Payer: OTHER GOVERNMENT

## 2023-12-22 VITALS
BODY MASS INDEX: 27.2 KG/M2 | WEIGHT: 190 LBS | RESPIRATION RATE: 20 BRPM | DIASTOLIC BLOOD PRESSURE: 83 MMHG | TEMPERATURE: 98 F | HEIGHT: 70 IN | SYSTOLIC BLOOD PRESSURE: 134 MMHG | HEART RATE: 59 BPM | OXYGEN SATURATION: 100 %

## 2023-12-22 DIAGNOSIS — L03.312 CELLULITIS OF SKIN OF BACK: Primary | ICD-10-CM

## 2023-12-22 PROCEDURE — 99213 PR OFFICE/OUTPT VISIT, EST, LEVL III, 20-29 MIN: ICD-10-PCS | Mod: 25,,, | Performed by: NURSE PRACTITIONER

## 2023-12-22 PROCEDURE — 90471 IMMUNIZATION ADMIN: CPT | Mod: 59,,, | Performed by: NURSE PRACTITIONER

## 2023-12-22 PROCEDURE — 90686 FLU VACCINE (QUAD) GREATER THAN OR EQUAL TO 3YO PRESERVATIVE FREE IM: ICD-10-PCS | Mod: ,,, | Performed by: NURSE PRACTITIONER

## 2023-12-22 PROCEDURE — 90686 IIV4 VACC NO PRSV 0.5 ML IM: CPT | Mod: ,,, | Performed by: NURSE PRACTITIONER

## 2023-12-22 PROCEDURE — 99213 OFFICE O/P EST LOW 20 MIN: CPT | Mod: 25,,, | Performed by: NURSE PRACTITIONER

## 2023-12-22 PROCEDURE — 90471 FLU VACCINE (QUAD) GREATER THAN OR EQUAL TO 3YO PRESERVATIVE FREE IM: ICD-10-PCS | Mod: 59,,, | Performed by: NURSE PRACTITIONER

## 2023-12-22 PROCEDURE — 96372 PR INJECTION,THERAP/PROPH/DIAG2ST, IM OR SUBCUT: ICD-10-PCS | Mod: ,,, | Performed by: NURSE PRACTITIONER

## 2023-12-22 PROCEDURE — 96372 THER/PROPH/DIAG INJ SC/IM: CPT | Mod: ,,, | Performed by: NURSE PRACTITIONER

## 2023-12-22 RX ORDER — LINCOMYCIN HYDROCHLORIDE 300 MG/ML
600 INJECTION, SOLUTION INTRAMUSCULAR; INTRAVENOUS; SUBCONJUNCTIVAL
Status: COMPLETED | OUTPATIENT
Start: 2023-12-22 | End: 2023-12-22

## 2023-12-22 RX ORDER — SULFAMETHOXAZOLE AND TRIMETHOPRIM 800; 160 MG/1; MG/1
1 TABLET ORAL 2 TIMES DAILY
Qty: 20 TABLET | Refills: 0 | Status: SHIPPED | OUTPATIENT
Start: 2023-12-22

## 2023-12-22 RX ORDER — DICLOFENAC SODIUM 75 MG/1
75 TABLET, DELAYED RELEASE ORAL 2 TIMES DAILY
COMMUNITY
Start: 2023-11-06

## 2023-12-22 RX ORDER — MUPIROCIN 20 MG/G
OINTMENT TOPICAL 3 TIMES DAILY
Qty: 30 G | Refills: 0 | Status: SHIPPED | OUTPATIENT
Start: 2023-12-22

## 2023-12-22 RX ADMIN — LINCOMYCIN HYDROCHLORIDE 600 MG: 300 INJECTION, SOLUTION INTRAMUSCULAR; INTRAVENOUS; SUBCONJUNCTIVAL at 11:12

## 2023-12-22 NOTE — PROGRESS NOTES
Lesa Mills NP   [place] [id number]  [place]  [address]      PATIENT NAME: Tasia Soto  : 1970  DATE: 23  MRN: [id number]      Billing Provider: Lesa Mills NP  Level of Service: [place] OFFICE/OUTPT VISIT, EST, LEVL III, 20-29 MIN  Patient PCP Information       Provider PCP Type    [facility]            Reason for Visit / Chief Complaint: Insect Bite (Pt reports she has a spider bite on her back that is nickel size. .Pt states it has been there for over 2 weeks. Bite is swollen,tender to touch and ,warm to touch.) and Immunizations (Pt is requesting flu vaccine today. )         History of Present Illness / Problem Focused Workflow     53 year old female presents to clinic with complaints of insect bite to left upper back. She states this area has been there for over 2 weeks. Recalls being outside and feeling like something bit her but didn't think anything about it until large bump appeared. Area is is red, swollen, warm, and tender to touch.         Review of Systems     @Review of Systems   Constitutional:  Negative for activity change, appetite change, fatigue and fever.   HENT:  Negative for nasal congestion, ear pain, rhinorrhea, sinus pressure/congestion and sore throat.    Eyes:  Negative for pain, redness, visual disturbance and eye dryness.   Respiratory:  Negative for cough and shortness of breath.    Cardiovascular:  Negative for chest pain and leg swelling.   Gastrointestinal:  Negative for abdominal distention, abdominal pain, constipation and diarrhea.   Endocrine: Negative for cold intolerance, heat intolerance and polyuria.   Genitourinary:  Negative for bladder incontinence, dysuria, frequency and urgency.   Musculoskeletal:  Negative for arthralgias, gait problem and myalgias.   Integumentary:  Positive for wound. Negative for color change and rash.   Allergic/Immunologic: Negative for environmental allergies and food allergies.    Neurological:  Negative for dizziness, weakness, light-headedness and headaches.   Psychiatric/Behavioral:  Negative for behavioral problems and sleep disturbance.        Medical / Social / Family History     Past Medical History:   Diagnosis Date    Endometriosis     Fatty liver 2019    diagnosed in [place] at [facility]       Past Surgical History:   Procedure Laterality Date    HYSTERECTOMY      OOPHORECTOMY Left     ROBOT-ASSISTED EXPLORATORY LAPAROSCOPY      TUBAL LIGATION  2018       Medications and Allergies     Medications  Outpatient Medications Marked as Taking for the 12/22/23 encounter (Office Visit) with Lesa Mills NP   Medication Sig Dispense Refill    diclofenac (VOLTAREN) 75 MG EC tablet Take 75 mg by mouth 2 (two) times daily.      famotidine (PEPCID) 20 MG tablet Take 1 tablet (20 mg total) by mouth 2 (two) times daily. (Patient taking differently: Take 20 mg by mouth once daily.) 180 tablet 1    FLUoxetine 10 MG Tab Take 1 tablet by mouth once daily.      pantoprazole (PROTONIX) 40 MG tablet Take 1 tablet (40 mg total) by mouth once daily. 90 tablet 1     Current Facility-Administered Medications for the 12/22/23 encounter (Office Visit) with Lesa Mills NP   Medication Dose Route Frequency Provider Last Rate Last Admin    [COMPLETED] lincomycin injection 600 mg  600 mg Intramuscular 1 time in Clinic/HOD Lesa Mills NP   600 mg at 12/22/23 1115       Allergies  Review of patient's allergies indicates:  No Known Allergies    Physical Examination     Vitals:    12/22/23 1000   BP: 134/83   Pulse: (!) 59   Resp: 20   Temp: 97.8 °F (36.6 °C)     Physical Exam  Vitals and nursing note reviewed.   HENT:      Head: Normocephalic.      Right Ear: Tympanic membrane normal.      Left Ear: Tympanic membrane normal.      Nose: Nose normal.      Mouth/Throat:      Mouth: Mucous membranes are moist.      Pharynx: Oropharynx is clear. No posterior oropharyngeal erythema.   Eyes:       Conjunctiva/sclera: Conjunctivae normal.   Cardiovascular:      Rate and Rhythm: Normal rate and regular rhythm.      Pulses: Normal pulses.      Heart sounds: Normal heart sounds.   Pulmonary:      Effort: Pulmonary effort is normal.      Breath sounds: Normal breath sounds.   Abdominal:      General: Abdomen is flat. Bowel sounds are normal. There is no distension.      Palpations: Abdomen is soft.   Musculoskeletal:         General: No swelling or tenderness. Normal range of motion.      Cervical back: Normal range of motion.      Right lower leg: No edema.      Left lower leg: No edema.   Skin:     General: Skin is warm and dry.      Capillary Refill: Capillary refill takes less than 2 seconds.      Findings: Wound present.      Comments: Quarter sized mounded area to left upper that is red, warm, and tender to touch. No drainage noted. No area of fluctuance noted. No central necrosis.    Neurological:      Mental Status: She is alert. Mental status is at baseline.   Psychiatric:         Mood and Affect: Mood normal.         Behavior: Behavior normal.               Lab Results   Component Value Date    WBC 7.90 08/24/2022    HGB 13.2 08/24/2022    HCT 38.0 08/24/2022    MCV 93.6 08/24/2022     08/24/2022        CMP  Sodium   Date Value Ref Range Status   08/24/2022 138 136 - 145 mmol/L Final     Potassium   Date Value Ref Range Status   08/24/2022 3.7 3.5 - 5.1 mmol/L Final     Chloride   Date Value Ref Range Status   08/24/2022 103 98 - 107 mmol/L Final     CO2   Date Value Ref Range Status   08/24/2022 27 21 - 32 mmol/L Final     Glucose   Date Value Ref Range Status   08/24/2022 95 74 - 106 mg/dL Final     BUN   Date Value Ref Range Status   08/24/2022 11 7 - 18 mg/dL Final     Creatinine   Date Value Ref Range Status   08/24/2022 0.58 0.55 - 1.02 mg/dL Final     Calcium   Date Value Ref Range Status   08/24/2022 8.9 8.5 - 10.1 mg/dL Final     Total Protein   Date Value Ref Range Status   08/24/2022  7.3 6.4 - 8.2 g/dL Final     Albumin   Date Value Ref Range Status   08/24/2022 4.0 3.5 - 5.0 g/dL Final     Bilirubin, Total   Date Value Ref Range Status   08/24/2022 1.3 (H) 0.0 - 1.2 mg/dL Final     Alk Phos   Date Value Ref Range Status   08/24/2022 54 39 - 100 U/L Final     AST   Date Value Ref Range Status   08/24/2022 17 15 - 37 U/L Final     ALT   Date Value Ref Range Status   08/24/2022 37 13 - 56 U/L Final     Anion Gap   Date Value Ref Range Status   08/24/2022 12 7 - 16 mmol/L Final     eGFR   Date Value Ref Range Status   08/24/2022 111 >=60 mL/min/1.73m² Final     Procedures   Assessment and Plan (including Health Maintenance)   :    Plan:     Problem List Items Addressed This Visit          ID    Cellulitis of skin of back - Primary    Current Assessment & Plan     Cellulitis possibly from insect bite, patient unsure. Quarter sized mounded area to left upper that is red, warm, and tender to touch. No drainage noted. No area of fluctuance noted. No central necrosis. Will treat with lincocin IM in clinic, Bactrim as ordered. Instructed to wash with antibacterial soap BID, pat dry, and apply Mupirocin ointment. Follow up if symptoms worsen or persist past antibiotic treatment. She verbalized understanding.             Health Maintenance Topics with due status: Not Due       Topic Last Completion Date    Lipid Panel 08/24/2022    Colorectal Cancer Screening 10/17/2022       No future appointments.       Health Maintenance Due   Topic Date Due    Hepatitis C Screening  Never done    TETANUS VACCINE  Never done    Mammogram  Never done    Hemoglobin A1c (Diabetic Prevention Screening)  Never done    Shingles Vaccine (1 of 2) Never done    COVID-19 Vaccine (3 - 2023-24 season) 09/01/2023          Signature:  Lesa Mills NP  [place] [address]  [address]    Date of encounter: 12/22/23

## 2023-12-22 NOTE — ASSESSMENT & PLAN NOTE
Cellulitis possibly from insect bite, patient unsure. Quarter sized mounded area to left upper that is red, warm, and tender to touch. No drainage noted. No area of fluctuance noted. No central necrosis. Will treat with lincocin IM in clinic, Bactrim as ordered. Instructed to wash with antibacterial soap BID, pat dry, and apply Mupirocin ointment. Follow up if symptoms worsen or persist past antibiotic treatment. She verbalized understanding.

## 2024-08-12 LAB
HBA1C MFR BLD: 5.6 % (ref 4–6)
HM MAMMOGRAM: NORMAL

## 2024-08-14 LAB — PAP RECOMMENDATION EXT: NORMAL

## 2024-08-20 ENCOUNTER — OFFICE VISIT (OUTPATIENT)
Dept: FAMILY MEDICINE | Facility: CLINIC | Age: 54
End: 2024-08-20
Payer: OTHER GOVERNMENT

## 2024-08-20 VITALS
DIASTOLIC BLOOD PRESSURE: 84 MMHG | HEART RATE: 67 BPM | HEIGHT: 70 IN | RESPIRATION RATE: 19 BRPM | SYSTOLIC BLOOD PRESSURE: 129 MMHG | WEIGHT: 211.19 LBS | TEMPERATURE: 98 F | OXYGEN SATURATION: 98 % | BODY MASS INDEX: 30.23 KG/M2

## 2024-08-20 DIAGNOSIS — R59.9 LYMPH NODE ENLARGEMENT: Primary | ICD-10-CM

## 2024-08-20 LAB
BASOPHILS # BLD AUTO: 0.05 K/UL (ref 0–0.2)
BASOPHILS NFR BLD AUTO: 0.7 % (ref 0–1)
DIFFERENTIAL METHOD BLD: ABNORMAL
EOSINOPHIL # BLD AUTO: 0.11 K/UL (ref 0–0.5)
EOSINOPHIL NFR BLD AUTO: 1.5 % (ref 1–4)
ERYTHROCYTE [DISTWIDTH] IN BLOOD BY AUTOMATED COUNT: 12.3 % (ref 11.5–14.5)
ERYTHROCYTE [SEDIMENTATION RATE] IN BLOOD BY WESTERGREN METHOD: 4 MM/HR (ref 0–30)
HCT VFR BLD AUTO: 37.7 % (ref 38–47)
HGB BLD-MCNC: 12 G/DL (ref 12–16)
IMM GRANULOCYTES # BLD AUTO: 0.03 K/UL (ref 0–0.04)
IMM GRANULOCYTES NFR BLD: 0.4 % (ref 0–0.4)
LYMPHOCYTES # BLD AUTO: 2.76 K/UL (ref 1–4.8)
LYMPHOCYTES NFR BLD AUTO: 38.1 % (ref 27–41)
MCH RBC QN AUTO: 32 PG (ref 27–31)
MCHC RBC AUTO-ENTMCNC: 31.8 G/DL (ref 32–36)
MCV RBC AUTO: 100.5 FL (ref 80–96)
MONOCYTES # BLD AUTO: 0.9 K/UL (ref 0–0.8)
MONOCYTES NFR BLD AUTO: 12.4 % (ref 2–6)
MPC BLD CALC-MCNC: 11.2 FL (ref 9.4–12.4)
NEUTROPHILS # BLD AUTO: 3.39 K/UL (ref 1.8–7.7)
NEUTROPHILS NFR BLD AUTO: 46.9 % (ref 53–65)
NRBC # BLD AUTO: 0 X10E3/UL
NRBC, AUTO (.00): 0 %
PLATELET # BLD AUTO: 291 K/UL (ref 150–400)
RBC # BLD AUTO: 3.75 M/UL (ref 4.2–5.4)
WBC # BLD AUTO: 7.24 K/UL (ref 4.5–11)

## 2024-08-20 PROCEDURE — 99214 OFFICE O/P EST MOD 30 MIN: CPT | Mod: ,,,

## 2024-08-20 PROCEDURE — 85651 RBC SED RATE NONAUTOMATED: CPT | Mod: ,,, | Performed by: CLINICAL MEDICAL LABORATORY

## 2024-08-20 PROCEDURE — 85025 COMPLETE CBC W/AUTO DIFF WBC: CPT | Mod: ,,, | Performed by: CLINICAL MEDICAL LABORATORY

## 2024-08-20 RX ORDER — FLUOXETINE HYDROCHLORIDE 40 MG/1
40 CAPSULE ORAL DAILY
COMMUNITY
Start: 2024-08-12

## 2024-08-20 RX ORDER — MUPIROCIN 20 MG/G
OINTMENT TOPICAL 3 TIMES DAILY
Qty: 30 G | Refills: 1 | Status: SHIPPED | OUTPATIENT
Start: 2024-08-20

## 2024-08-20 NOTE — PROGRESS NOTES
ANNABELLE HOBSON   [facility]  [address]  [place]  [address]      PATIENT NAME: Tasia Soto  : 1970  DATE: 24  MRN: [id number]        Reason for Visit / Chief Complaint: Arm Pain (Patient reports she had mammogram done about 1 month ago. Results show she had swollen lymph node under left arm. Patient reports under left arm is still painful and tender to touch. Denies any drainage from breast. Has not noticed changes in size or breast or under arm area. )         History of Present Illness / Problem Focused Workflow     Patient reports she had mammogram done about 1 month ago. Results show she had swollen lymph node under left arm. Patient reports under left arm is still painful and tender to touch. Denies any drainage from breast. Has not noticed changes in size or breast or under arm area.       Review of Systems     @Review of Systems   Constitutional:  Negative for chills, fatigue and fever.   HENT:  Negative for nasal congestion, ear discharge, ear pain, rhinorrhea, sinus pressure/congestion and sore throat.    Respiratory:  Negative for cough, chest tightness, shortness of breath, wheezing and stridor.    Cardiovascular:  Negative for palpitations and claudication.   Gastrointestinal:  Negative for abdominal pain, constipation, diarrhea, nausea, vomiting and reflux.   Genitourinary:  Negative for dysuria, flank pain, frequency, hematuria and urgency.   Musculoskeletal:  Negative for myalgias.   Integumentary:  Moles: enlarged lymph node left axialla.   Neurological:  Negative for dizziness, weakness, light-headedness and headaches.   Psychiatric/Behavioral:  Negative for suicidal ideas.        Medical / Social / Family History     Past Medical History:   Diagnosis Date    Endometriosis     Fatty liver 2019    diagnosed in [place] at [facility]       Past Surgical History:   Procedure Laterality Date    HYSTERECTOMY      OOPHORECTOMY Left     ROBOT-ASSISTED EXPLORATORY  LAPAROSCOPY      TUBAL LIGATION  2018           Medications and Allergies     Medications  Outpatient Medications Marked as Taking for the 8/20/24 encounter (Office Visit) with Patrice Hunt FNP   Medication Sig Dispense Refill    diclofenac (VOLTAREN) 75 MG EC tablet Take 75 mg by mouth 2 (two) times daily.      FLUoxetine 40 MG capsule Take 40 mg by mouth once daily.      pantoprazole (PROTONIX) 40 MG tablet Take 1 tablet (40 mg total) by mouth once daily. 90 tablet 1    [DISCONTINUED] FLUoxetine 10 MG Tab Take 1 tablet by mouth once daily.      [DISCONTINUED] mupirocin (BACTROBAN) 2 % ointment Apply topically 3 (three) times daily. 30 g 0       Allergies  Review of patient's allergies indicates:  No Known Allergies    Physical Examination     Vitals:    08/20/24 1625   BP: 129/84   Pulse: 67   Resp: 19   Temp: 97.9 °F (36.6 °C)     Physical Exam  Vitals and nursing note reviewed.   Constitutional:       General: She is awake.      Appearance: Normal appearance.   HENT:      Head: Normocephalic.      Right Ear: Tympanic membrane, ear canal and external ear normal.      Left Ear: Tympanic membrane, ear canal and external ear normal.      Nose: Nose normal.      Mouth/Throat:      Lips: Pink.      Mouth: Mucous membranes are moist.      Pharynx: Oropharynx is clear. Uvula midline.   Cardiovascular:      Rate and Rhythm: Normal rate and regular rhythm.      Heart sounds: Normal heart sounds, S1 normal and S2 normal.   Pulmonary:      Effort: Pulmonary effort is normal. No respiratory distress.      Breath sounds: Normal breath sounds. No decreased breath sounds, wheezing, rhonchi or rales.   Abdominal:      General: Bowel sounds are normal.      Palpations: Abdomen is soft.      Tenderness: There is no abdominal tenderness.   Musculoskeletal:      Cervical back: Normal range of motion.   Lymphadenopathy:      Upper Body:      Left upper body: Axillary adenopathy present.   Skin:     General: Skin is warm.       Capillary Refill: Capillary refill takes less than 2 seconds.   Neurological:      Mental Status: She is alert and oriented to person, place, and time.   Psychiatric:         Thought Content: Thought content does not include homicidal or suicidal ideation. Thought content does not include homicidal or suicidal plan.               Procedures   Assessment and Plan (including Health Maintenance)   :    Plan:     Problem List Items Addressed This Visit          Other    Lymph node enlargement - Primary    Current Assessment & Plan     US soft tissue ordered and will call pt with results of US and lab work with any new orders         Relevant Medications    mupirocin (BACTROBAN) 2 % ointment    Other Relevant Orders    CBC Auto Differential (Completed)    Sedimentation Rate (Completed)    US Soft Tissue Axilla, Left       Health Maintenance Topics with due status: Not Due       Topic Last Completion Date    Lipid Panel 08/24/2022    Colorectal Cancer Screening 10/17/2022    Hemoglobin A1c (Diabetic Prevention Screening) 08/12/2024    Mammogram 08/12/2024    Cervical Cancer Screening 09/05/2024       No future appointments.     Health Maintenance Due   Topic Date Due    Hepatitis C Screening  Never done    HIV Screening  Never done    TETANUS VACCINE  Never done    Shingles Vaccine (1 of 2) Never done    Influenza Vaccine (1) 09/01/2024    COVID-19 Vaccine (3 - 2023-24 season) 09/01/2024        Follow up if symptoms worsen or fail to improve.     Signature:  ANNABELLE HOBSON  [facility]  [address] [address] [place]  [address]    Date of encounter: 8/20/24

## 2024-08-20 NOTE — Clinical Note
Patient had mammogram complete at [facility] last month: [address] [place] [address]. She also had some lab work complete Please obtain for Care gaps.

## 2024-08-21 ENCOUNTER — HOSPITAL ENCOUNTER (OUTPATIENT)
Dept: RADIOLOGY | Facility: HOSPITAL | Age: 54
Discharge: HOME | End: 2024-08-21
Payer: OTHER GOVERNMENT

## 2024-08-21 DIAGNOSIS — R59.9 LYMPH NODE ENLARGEMENT: ICD-10-CM

## 2024-08-26 ENCOUNTER — PATIENT OUTREACH (OUTPATIENT)
Facility: HOSPITAL | Age: 54
End: 2024-08-26
Payer: OTHER GOVERNMENT

## 2024-08-26 NOTE — PROGRESS NOTES
Population Health Chart Review & Patient Outreach Details      Further Action Needed If Patient Returns Outreach:      24 CHRISTOPHER sent to [facility] for recent pap smear TC,Lpn-CCC      Updates Requested / Reviewed:     [x]  Care Everywhere    [x]     []  External Sources ([facility], [facility], [facility], etc.)    [] [facility]   [] [facility]   [] Other:    [x]  Care Team Updated   []  Removed  or Duplicate Orders   []  Immunization Reconciliation Completed / Queried    [] [place]   [] [place]   [] [place]   [] [place]      Health Maintenance Topics Addressed and Outreach Outcomes / Actions Taken:             Breast Cancer Screening []  Mammogram Order Placed    []  Mammogram Screening Scheduled    []  External Records Requested & Care Team Updated if Applicable    []  External Records Uploaded & Care Team Updated if Applicable    []  Pt Declined Scheduling Mammogram    []  Pt Will Schedule with External Provider / Order Routed & Care Team Updated if Applicable              Cervical Cancer Screening []  Pap Smear Scheduled in Primary Care or OBGYN    [x]  External Records Requested & Care Team Updated if Applicable       []  External Records Uploaded, Care Team Updated, & History Updated if Applicable    []  Patient Declined Scheduling Pap Smear    []  Patient Will Schedule with External Provider & Care Team Updated if Applicable                  Colorectal Cancer Screening []  Colonoscopy Case Request / Referral / Home Test Order Placed    []  External Records Requested & Care Team Updated if Applicable    []  External Records Uploaded, Care Team Updated, & History Updated if Applicable    []  Patient Declined Completing Colon Cancer Screening    []  Patient Will Schedule with External Provider & Care Team Updated if Applicable    []  Fit Kit Mailed (add the SmartPhrase under additional notes)    []  Reminded Patient to Complete Home Test                Diabetic Eye Exam []  Eye Exam Screening Order  Placed    []  Eye Camera Scheduled or Optometry/Ophthalmology Referral Placed    []  External Records Requested & Care Team Updated if Applicable    []  External Records Uploaded, Care Team Updated, & History Updated if Applicable    []  Patient Declined Scheduling Eye Exam    []  Patient Will Schedule with External Provider & Care Team Updated if Applicable             Blood Pressure Control []  Primary Care Follow Up Visit Scheduled     []  Remote Blood Pressure Reading Captured    []  Patient Declined Remote Reading or Scheduling Appt - Escalated to PCP    []  Patient Will Call Back or Send Portal Message with Reading                 HbA1c & Other Labs []  Overdue Lab(s) Ordered    []  Overdue Lab(s) Scheduled    []  External Records Uploaded & Care Team Updated if Applicable    []  Primary Care Follow Up Visit Scheduled     []  Reminded Patient to Complete A1c Home Test    []  Patient Declined Scheduling Labs or Will Call Back to Schedule    []  Patient Will Schedule with External Provider / Order Routed, & Care Team Updated if Applicable           Primary Care Appointment []  Primary Care Appt Scheduled    []  Patient Declined Scheduling or Will Call Back to Schedule    []  Pt Established with External Provider, Updated Care Team, & Informed Pt to Notify Payor if Applicable           Medication Adherence /    Statin Use []  Primary Care Appointment Scheduled    []  Patient Reminded to  Prescription    []  Patient Declined, Provider Notified if Needed    []  Sent Provider Message to Review to Evaluate Pt for Statin, Add Exclusion Dx Codes, Document   Exclusion in Problem List, Change Statin Intensity Level to Moderate or High Intensity if Applicable                Osteoporosis Screening []  Dexa Order Placed    []  Dexa Appointment Scheduled    []  External Records Requested & Care Team Updated    []  External Records Uploaded, Care Team Updated, & History Updated if Applicable    []  Patient Declined  Scheduling Dexa or Will Call Back to Schedule    []  Patient Will Schedule with External Provider / Order Routed & Care Team Updated if Applicable       Additional Notes:

## 2024-08-26 NOTE — LETTER
AUTHORIZATION FOR RELEASE OF   CONFIDENTIAL INFORMATION    Dear [facility],    We are seeing Tasia Soto, date of birth 1970, in the clinic at [facility]. Patrice Hunt FNP is the patient's PCP. Tsaia Soto has an outstanding lab/procedure at the time we reviewed her chart. In order to help keep her health information updated, she has authorized us to request the following medical record(s):        (X  )  MAMMOGRAM                                      (  )  COLONOSCOPY      X  )  PAP SMEAR                                          ( X )  OUTSIDE LAB RESULTS         Please fax records to [facility], [place], Morgan, FNP, [phone]     If you have any questions, please contact [facility] at [phone].           Patient Name: Tasia Soto  : 1970  Patient Phone #: [phone]

## 2024-09-05 ENCOUNTER — PATIENT OUTREACH (OUTPATIENT)
Facility: HOSPITAL | Age: 54
End: 2024-09-05
Payer: OTHER GOVERNMENT

## 2024-09-05 NOTE — PROGRESS NOTES

## 2024-09-20 PROBLEM — R59.9 LYMPH NODE ENLARGEMENT: Status: ACTIVE | Noted: 2024-09-20

## 2024-12-12 ENCOUNTER — TELEPHONE (OUTPATIENT)
Dept: FAMILY MEDICINE | Facility: CLINIC | Age: 54
End: 2024-12-12
Payer: OTHER GOVERNMENT

## 2024-12-12 NOTE — TELEPHONE ENCOUNTER
Contacted patient about all incomplete care gaps 12/12/2024 Results are as follows:   Health Maintenance Due   Topic Date Due    Hepatitis C Screening  Never done    HIV Screening  Never done    TETANUS VACCINE  Never done    Shingles Vaccine (1 of 2) Never done    Influenza Vaccine (1) 09/01/2024    COVID-19 Vaccine (3 - 2024-25 season) 09/01/2024     Hep C and HIV screenings completed at [facility] in [place], [place] on [facility].within the last 3-5 years patient states  Cervical Cancer and HIV screening completed this year at [facility] with Dr. Almodovar   Influenza Vaccine completed at [facility] in [facility] October 2024 where patient works  Tetanus Vaccine Order placed for next visit per patient request  Shingles and Covid Vaccines patient is aware she may obtain at any local pharmacy if she changes her mind and decides to get either of them     Patient states she was tired of waiting on an appointment from her referral to Endocrinology so she went to [facility] where they did an US of her Thyroid and a 4.2cm nodule was found . She was then sent to endocrinologist Dr. Woody Sistrunk in [place], [place]. She is scheduled for surgical telehealth consult 01/14/2025 with [facility] to schedule removal surgery with DR. Favian Calderon. Patient wanted this update in her chart today.

## 2024-12-18 ENCOUNTER — PATIENT OUTREACH (OUTPATIENT)
Facility: HOSPITAL | Age: 54
End: 2024-12-18
Payer: OTHER GOVERNMENT

## 2024-12-18 NOTE — LETTER
AUTHORIZATION FOR RELEASE OF   CONFIDENTIAL INFORMATION    Dear [facility],    We are seeing Tasia Soto, date of birth 1970, in the clinic at [facility]. Patrice Hunt FNP is the patient's PCP. Tasia Soto has an outstanding lab/procedure at the time we reviewed her chart. In order to help keep her health information updated, she has authorized us to request the following medical record(s):        (  )  MAMMOGRAM                                      (  )  COLONOSCOPY      (  )  PAP SMEAR                                          (X)  OUTSIDE LAB RESULTS     (  )  DEXA SCAN                                          (  )  EYE EXAM            (  )  FOOT EXAM                                          (  )  ENTIRE RECORD     (  )  OUTSIDE IMMUNIZATIONS                 (  )  _______________         Please fax records to [facility] Care Coordinator, Mellissa Manrique, [phone].     If you have any questions, please contact [phone].          Patient Name: Tasia Soto  : 1970  Patient Phone #: [phone]

## 2024-12-26 ENCOUNTER — OFFICE VISIT (OUTPATIENT)
Dept: FAMILY MEDICINE | Facility: CLINIC | Age: 54
End: 2024-12-26
Payer: OTHER GOVERNMENT

## 2024-12-26 VITALS
HEIGHT: 70 IN | DIASTOLIC BLOOD PRESSURE: 82 MMHG | HEART RATE: 70 BPM | RESPIRATION RATE: 16 BRPM | BODY MASS INDEX: 30.21 KG/M2 | OXYGEN SATURATION: 99 % | SYSTOLIC BLOOD PRESSURE: 126 MMHG | WEIGHT: 211 LBS | TEMPERATURE: 98 F

## 2024-12-26 DIAGNOSIS — K21.9 GASTROESOPHAGEAL REFLUX DISEASE WITHOUT ESOPHAGITIS: ICD-10-CM

## 2024-12-26 DIAGNOSIS — R10.9 ABDOMINAL PAIN, UNSPECIFIED ABDOMINAL LOCATION: ICD-10-CM

## 2024-12-26 DIAGNOSIS — R82.998 DARK URINE: ICD-10-CM

## 2024-12-26 DIAGNOSIS — R30.0 DYSURIA: Primary | ICD-10-CM

## 2024-12-26 LAB
BILIRUB SERPL-MCNC: NEGATIVE MG/DL
BLOOD URINE, POC: NEGATIVE
CLARITY, UA: CLEAR
COLOR, UA: NORMAL
GLUCOSE UR QL STRIP: NEGATIVE
KETONES UR QL STRIP: NEGATIVE
LEUKOCYTE ESTERASE URINE, POC: NEGATIVE
NITRITE, POC UA: NEGATIVE
PH, POC UA: 6
PROTEIN, POC: NORMAL
SPECIFIC GRAVITY, POC UA: >=1.03
UROBILINOGEN, POC UA: 1

## 2024-12-26 PROCEDURE — 87086 URINE CULTURE/COLONY COUNT: CPT | Mod: ,,, | Performed by: CLINICAL MEDICAL LABORATORY

## 2024-12-26 RX ORDER — HYDROCHLOROTHIAZIDE 12.5 MG/1
12.5 TABLET ORAL DAILY
COMMUNITY

## 2024-12-26 RX ORDER — CETIRIZINE HYDROCHLORIDE, PSEUDOEPHEDRINE HYDROCHLORIDE 5; 120 MG/1; MG/1
1 TABLET, FILM COATED, EXTENDED RELEASE ORAL 2 TIMES DAILY
COMMUNITY
Start: 2024-10-18

## 2024-12-26 RX ORDER — PANTOPRAZOLE SODIUM 40 MG/1
40 TABLET, DELAYED RELEASE ORAL DAILY
Qty: 90 TABLET | Refills: 1 | Status: SHIPPED | OUTPATIENT
Start: 2024-12-26

## 2024-12-26 RX ORDER — PANTOPRAZOLE SODIUM 40 MG/1
40 TABLET, DELAYED RELEASE ORAL DAILY
Qty: 90 TABLET | Refills: 1 | Status: SHIPPED | OUTPATIENT
Start: 2024-12-26 | End: 2024-12-26

## 2024-12-26 RX ORDER — METOCLOPRAMIDE HYDROCHLORIDE 5 MG/5ML
10 SOLUTION ORAL
COMMUNITY
Start: 2024-12-18 | End: 2025-01-17

## 2024-12-26 NOTE — PROGRESS NOTES
"   Edward South DO   [place] [address] [facility] [address]  [place]  [address]      PATIENT NAME: Tasia Soto  : 1970  DATE: 24  MRN: [id number]      Billing Provider: Edward South DO  Level of Service:   Patient PCP Information       Provider PCP Type    ANNABELLE HOBSON General            Reason for Visit / Chief Complaint: Pelvic Discomfort (Pt is a 53 yo female who presents to the clinic with difficulty urinating and dark urine X 2 weeks. She also reports swollen and sore lymph nodes in her pelvic area and a "bulge" in her pelvis. Pt states that she has a bladder sling that was inserted when she had her hysterectomy in . )         History of Present Illness / Problem Focused Workflow     HPI    HPI as noted.  Patient denies dysuria, hematuria, fevers, chills, palpitations, diaphoresis, dizziness and lightheadedness.  Patient also denies melena, hematochezia and changes in stool caliber.  Patient states that she has not been sexually active in several months and has not noted any vaginal discharge.  Patient denies any history of STI.    Review of Systems     @Review of Systems   Constitutional:  Negative for chills, fatigue and fever.   Eyes:  Negative for visual disturbance.   Respiratory:  Negative for cough and shortness of breath.    Cardiovascular:  Negative for chest pain, palpitations and leg swelling.   Gastrointestinal:  Negative for abdominal pain, diarrhea, rectal pain and vomiting.   Musculoskeletal:  Negative for arthralgias.   Neurological:  Negative for dizziness and light-headedness.       Medical / Social / Family History     Past Medical History:   Diagnosis Date    Endometriosis     Hysterectomy    Fatty liver 2019    [facility] [place] [facility]       Past Surgical History:   Procedure Laterality Date    BLADDER SUSPENSION      HYSTERECTOMY      OOPHORECTOMY Right     ROBOT-ASSISTED EXPLORATORY LAPAROSCOPY      TUBAL LIGATION   "       Medications and Allergies     Medications  Outpatient Medications Marked as Taking for the 12/26/24 encounter (Office Visit) with Edward South, DO   Medication Sig Dispense Refill    cetirizine-pseudoephedrine 5-120 mg Tb12 Take 1 tablet by mouth 2 (two) times daily.      FLUoxetine 40 MG capsule Take 40 mg by mouth once daily.      hydroCHLOROthiazide (HYDRODIURIL) 12.5 MG Tab Take 12.5 mg by mouth once daily.      metoclopramide HCl (REGLAN) 5 mg/5 mL Soln Take 10 mg by mouth 4 (four) times daily before meals and nightly.      [DISCONTINUED] pantoprazole (PROTONIX) 40 MG tablet Take 1 tablet (40 mg total) by mouth once daily. 90 tablet 1       Allergies  Review of patient's allergies indicates:  No Known Allergies    Physical Examination     Vitals:    12/26/24 0929   BP: 126/82   Pulse: 70   Resp: 16   Temp: 98 °F (36.7 °C)     Physical Exam  Vitals reviewed.   Constitutional:       General: She is not in acute distress.     Appearance: She is not ill-appearing, toxic-appearing or diaphoretic.   HENT:      Head: Normocephalic and atraumatic.      Right Ear: External ear normal.      Left Ear: External ear normal.      Mouth/Throat:      Mouth: Mucous membranes are moist.   Eyes:      General: No scleral icterus.     Pupils: Pupils are equal, round, and reactive to light.   Cardiovascular:      Rate and Rhythm: Normal rate and regular rhythm.      Heart sounds: Normal heart sounds. No murmur heard.     No friction rub. No gallop.   Pulmonary:      Effort: Pulmonary effort is normal. No respiratory distress.      Breath sounds: Normal breath sounds. No wheezing, rhonchi or rales.   Abdominal:      General: Bowel sounds are normal.      Palpations: Abdomen is soft.      Tenderness: There is no abdominal tenderness. There is no guarding or rebound.   Musculoskeletal:         General: No swelling.      Right lower leg: No edema.      Left lower leg: No edema.   Skin:     General: Skin is warm and dry.       Coloration: Skin is not jaundiced.      Findings: No erythema or rash.   Neurological:      Mental Status: She is alert and oriented to person, place, and time.               Lab Results   Component Value Date    WBC 7.24 08/20/2024    HGB 12.0 08/20/2024    HCT 37.7 (L) 08/20/2024    .5 (H) 08/20/2024     08/20/2024        CMP  Sodium   Date Value Ref Range Status   08/24/2022 138 136 - 145 mmol/L Final     Potassium   Date Value Ref Range Status   08/24/2022 3.7 3.5 - 5.1 mmol/L Final     Chloride   Date Value Ref Range Status   08/24/2022 103 98 - 107 mmol/L Final     CO2   Date Value Ref Range Status   08/24/2022 27 21 - 32 mmol/L Final     Glucose   Date Value Ref Range Status   08/24/2022 95 74 - 106 mg/dL Final     BUN   Date Value Ref Range Status   08/24/2022 11 7 - 18 mg/dL Final     Creatinine   Date Value Ref Range Status   08/24/2022 0.58 0.55 - 1.02 mg/dL Final     Calcium   Date Value Ref Range Status   08/24/2022 8.9 8.5 - 10.1 mg/dL Final     Total Protein   Date Value Ref Range Status   08/24/2022 7.3 6.4 - 8.2 g/dL Final     Albumin   Date Value Ref Range Status   08/24/2022 4.0 3.5 - 5.0 g/dL Final     Bilirubin, Total   Date Value Ref Range Status   08/24/2022 1.3 (H) 0.0 - 1.2 mg/dL Final     Alk Phos   Date Value Ref Range Status   08/24/2022 54 39 - 100 U/L Final     AST   Date Value Ref Range Status   08/24/2022 17 15 - 37 U/L Final     ALT   Date Value Ref Range Status   08/24/2022 37 13 - 56 U/L Final     Anion Gap   Date Value Ref Range Status   08/24/2022 12 7 - 16 mmol/L Final     eGFR   Date Value Ref Range Status   08/24/2022 111 >=60 mL/min/1.73m² Final     Procedures   Assessment and Plan (including Health Maintenance)   :    Plan:     Problem List Items Addressed This Visit    None  Visit Diagnoses       Dysuria    -  Primary    Relevant Orders    Urine culture    Dark urine        Relevant Orders    POCT URINALYSIS W/O SCOPE (Completed)    Abdominal pain, unspecified  abdominal location        Relevant Orders    CT Abdomen Pelvis  Without Contrast    Gastroesophageal reflux disease without esophagitis        Continue current medication and diet    Relevant Medications    pantoprazole (PROTONIX) 40 MG tablet        Patient reports a sensation of a protrusion in her lower abdomen.  Patient also states that she feels that she has to strain more to urinate.  No hernia visualized on exam today.  Patient states that Tylenol adequately addressed as the pain in his declining any additional medication.  CT abdomen and pelvis will be ordered.  Patient advised to contact her gynecologist as her symptoms may be related to the sling she had placed.  Follow up in 1 month or sooner if needed.  Patient counseled at length to call, return to clinic or present to the ED should symptoms persist or worsen.  Patient signals understanding and agreement with the plan of care.    Health Maintenance Topics with due status: Not Due       Topic Last Completion Date    Colorectal Cancer Screening 10/17/2022    Mammogram 08/12/2024    Hemoglobin A1c (Diabetic Prevention Screening) 09/04/2024    Lipid Panel 09/04/2024    Cervical Cancer Screening 09/05/2024    RSV Vaccine (Age 60+ and Pregnant patients) Not Due       Future Appointments   Date Time Provider Department Center   1/8/2025  8:30 AM [facility] CT1 [facility]    1/23/2025  8:00 AM Edward South DO [facility] [place]        Health Maintenance Due   Topic Date Due    Hepatitis C Screening  Never done    HIV Screening  Never done    TETANUS VACCINE  Never done    Shingles Vaccine (1 of 2) Never done    Pneumococcal Vaccines (Age 50+) (1 of 1 - PCV) Never done    Influenza Vaccine (1) 09/01/2024    COVID-19 Vaccine (4 - 2024-25 season) 09/01/2024          Signature:  Edward South DO  [facility]  [address] [place] [address], [place]  [address]    Date of encounter: 12/26/24

## 2024-12-28 LAB — UA COMPLETE W REFLEX CULTURE PNL UR: NO GROWTH

## 2025-01-02 ENCOUNTER — TELEPHONE (OUTPATIENT)
Dept: FAMILY MEDICINE | Facility: CLINIC | Age: 55
End: 2025-01-02

## 2025-01-02 NOTE — TELEPHONE ENCOUNTER
Patient called and said that her Gynecologist asked if you wouldn't mind changing the patient's CT order to also w/o contrast for that they could see it that way as well as with the contrast. Patient is scheduled for the CT on 01/08/2025. Patient asked for a call back so she would know as well. [phone].

## 2025-01-03 DIAGNOSIS — R10.9 ABDOMINAL PAIN, UNSPECIFIED ABDOMINAL LOCATION: Primary | ICD-10-CM

## 2025-01-03 NOTE — TELEPHONE ENCOUNTER
I called patient and asked her who her gynecologist was so I could verify the orders that they wanted. The patient told me she see's Dr. Perez at [facility]. I called and spoke with Jaylin Perez's nurse and she said that they would like the patient's CT with and without contrast that they are trying to R/O an Umbilical Hernia and Abdominal pain. Patient has an appointment in 2 weeks with Dr. Perez and he would like the CT results and our notes faxed to him at [phone].

## 2025-01-03 NOTE — TELEPHONE ENCOUNTER
The Patient was scheduled with the new changes. Radiology then called and said that they didn't think the patient needed with contrast and advised against it unless the patient was having renal issues and blood in her urine, I looked up patient's last urine on 12/26/2024 and she did not have blood in her urine so the radiologist said that they could only do without contrast. I told radiology that was fine that is what we had originally ordered and that I would let both physicians know. I called and spoke with dr. Perez's nurse Carol and she said that was fine and she would let her provider know. I then Called the patient and let her know and she verbally understood to keep her original appointment. Ophelia in Radiology at [facility] is also canceling the new order that was for the CT with and without and keeping the original order. Dr. Perez's office would still like a copy of the CT faxed to their office.

## 2025-01-08 ENCOUNTER — HOSPITAL ENCOUNTER (OUTPATIENT)
Dept: RADIOLOGY | Facility: HOSPITAL | Age: 55
Discharge: HOME | End: 2025-01-08
Attending: FAMILY MEDICINE
Payer: OTHER GOVERNMENT

## 2025-01-08 DIAGNOSIS — R10.9 ABDOMINAL PAIN, UNSPECIFIED ABDOMINAL LOCATION: ICD-10-CM

## 2025-01-08 PROCEDURE — 74176 CT ABD & PELVIS W/O CONTRAST: CPT | Mod: 26,,, | Performed by: STUDENT IN AN ORGANIZED HEALTH CARE EDUCATION/TRAINING PROGRAM

## 2025-01-08 PROCEDURE — 74176 CT ABD & PELVIS W/O CONTRAST: CPT | Mod: TC

## 2025-01-09 ENCOUNTER — TELEPHONE (OUTPATIENT)
Dept: FAMILY MEDICINE | Facility: CLINIC | Age: 55
End: 2025-01-09
Payer: OTHER GOVERNMENT

## 2025-01-09 DIAGNOSIS — R10.9 ABDOMINAL PAIN, UNSPECIFIED ABDOMINAL LOCATION: Primary | ICD-10-CM

## 2025-01-09 DIAGNOSIS — K76.0 STEATOSIS OF LIVER: ICD-10-CM

## 2025-01-09 NOTE — TELEPHONE ENCOUNTER
----- Message from Edward South DO sent at 1/9/2025  9:58 AM CST -----  Please let the patient know that CT revealed fatty enlargement of the liver.  Please advise her to refrain from alcohol use.  The fatty enlargement of her liver may have contributed to the dark urine she reported.   Patient should be seen by Gastroenterology.  Patient has been seen by Dr. Saucedo in the past.  If she wishes to continue being seen by Dr. Saucedo, she should be advised to make an appointment with him to follow up on her hepatic steatosis.  Otherwise, we can offer her a referral to GI.  Please let me know what her preference is.     Patient also has a small fat containing hernia.  This may have also been contributing to her symptoms.  If she is continuing to experience significant discomfort in the periumbilical area, please let me know and a referral to General surgery can be placed.  Thank you.

## 2025-01-09 NOTE — TELEPHONE ENCOUNTER
Discussed CT results with patient. She sees surgeon at [facility] for another health issue and will discuss hernia with him next week at her appointment. [facility]. She has seen Dr. Hartman at their clinic in the past.

## 2025-01-10 ENCOUNTER — TELEPHONE (OUTPATIENT)
Dept: FAMILY MEDICINE | Facility: CLINIC | Age: 55
End: 2025-01-10
Payer: OTHER GOVERNMENT

## 2025-01-10 DIAGNOSIS — K42.9 UMBILICAL HERNIA WITHOUT OBSTRUCTION AND WITHOUT GANGRENE: Primary | ICD-10-CM

## 2025-01-10 NOTE — TELEPHONE ENCOUNTER
Patient called back and reported that her surgeon at [facility] does not perform hernia surgery. She requested a referral to a local general surgeon. Discussed options with Pt and she requested Dr. Harlan Curtis at [facility].

## 2025-01-23 ENCOUNTER — OFFICE VISIT (OUTPATIENT)
Dept: FAMILY MEDICINE | Facility: CLINIC | Age: 55
End: 2025-01-23
Payer: OTHER GOVERNMENT

## 2025-01-23 VITALS
WEIGHT: 212.38 LBS | RESPIRATION RATE: 18 BRPM | OXYGEN SATURATION: 98 % | DIASTOLIC BLOOD PRESSURE: 80 MMHG | SYSTOLIC BLOOD PRESSURE: 123 MMHG | HEIGHT: 70 IN | BODY MASS INDEX: 30.4 KG/M2 | HEART RATE: 75 BPM | TEMPERATURE: 99 F

## 2025-01-23 DIAGNOSIS — R51.9 ACUTE INTRACTABLE HEADACHE, UNSPECIFIED HEADACHE TYPE: Primary | ICD-10-CM

## 2025-01-23 DIAGNOSIS — R09.81 NASAL CONGESTION: ICD-10-CM

## 2025-01-23 DIAGNOSIS — R05.1 ACUTE COUGH: ICD-10-CM

## 2025-01-23 DIAGNOSIS — R50.9 FEVER, UNSPECIFIED FEVER CAUSE: ICD-10-CM

## 2025-01-23 DIAGNOSIS — J01.90 ACUTE BACTERIAL SINUSITIS: ICD-10-CM

## 2025-01-23 DIAGNOSIS — B96.89 ACUTE BACTERIAL SINUSITIS: ICD-10-CM

## 2025-01-23 LAB
CTP QC/QA: YES
MOLECULAR STREP A: NEGATIVE
POC MOLECULAR INFLUENZA A AGN: NEGATIVE
POC MOLECULAR INFLUENZA B AGN: NEGATIVE
SARS-COV-2 RDRP RESP QL NAA+PROBE: NEGATIVE

## 2025-01-23 PROCEDURE — 87502 INFLUENZA DNA AMP PROBE: CPT | Mod: QW,,, | Performed by: FAMILY MEDICINE

## 2025-01-23 PROCEDURE — 87635 SARS-COV-2 COVID-19 AMP PRB: CPT | Mod: QW,,, | Performed by: FAMILY MEDICINE

## 2025-01-23 PROCEDURE — 96372 THER/PROPH/DIAG INJ SC/IM: CPT | Mod: ,,, | Performed by: FAMILY MEDICINE

## 2025-01-23 PROCEDURE — 99214 OFFICE O/P EST MOD 30 MIN: CPT | Mod: 25,,, | Performed by: FAMILY MEDICINE

## 2025-01-23 PROCEDURE — 87651 STREP A DNA AMP PROBE: CPT | Mod: QW,,, | Performed by: FAMILY MEDICINE

## 2025-01-23 RX ORDER — AZITHROMYCIN 250 MG/1
TABLET, FILM COATED ORAL
Qty: 6 TABLET | Refills: 0 | Status: SHIPPED | OUTPATIENT
Start: 2025-01-23 | End: 2025-01-28

## 2025-01-23 RX ORDER — IPRATROPIUM BROMIDE 21 UG/1
2 SPRAY, METERED NASAL 3 TIMES DAILY
Qty: 30 ML | Refills: 0 | Status: SHIPPED | OUTPATIENT
Start: 2025-01-23

## 2025-01-23 RX ORDER — METHYLPREDNISOLONE ACETATE 40 MG/ML
40 INJECTION, SUSPENSION INTRA-ARTICULAR; INTRALESIONAL; INTRAMUSCULAR; SOFT TISSUE ONCE
Status: COMPLETED | OUTPATIENT
Start: 2025-01-23 | End: 2025-01-23

## 2025-01-23 RX ADMIN — METHYLPREDNISOLONE ACETATE 40 MG: 40 INJECTION, SUSPENSION INTRA-ARTICULAR; INTRALESIONAL; INTRAMUSCULAR; SOFT TISSUE at 09:01

## 2025-01-23 NOTE — PROGRESS NOTES
Edward South DO   [facility]  [address] [facility] [address]  [place]  [address]      PATIENT NAME: Tasia Soto  : 1970  DATE: 25  MRN: [id number]      Billing Provider: Edward South DO  Level of Service:   Patient PCP Information       Provider PCP Type    ANNABELLE HOBSON General            Reason for Visit / Chief Complaint: Dysuria (Patient is Tasia Soto a 55 y/o female who reports she is here for a 1 month follow up on Dysuria. Patient states she has seen Gynecology and they are treating her for OAB and is on a medication ( she does not remember the name of it). Patient states she his doing well and her symptoms have improved. Patient states she still has some pain in sides but now believes it was a pulled muscle. ), CT results (Patient states she is here to discuss her CT results and says that she knows she has a hernia.  FYI patient states she is having part of her Thyroid removed at [facility] on 2025.), and Sinus Problem (Patient reports nasal congestion and a headache  since Saturday, Patient reports she had a low grade fever of 99 yesterday but it has since gone away. Patient states she has had a slight sore throat and swollen glands. Patient also reports a dry cough. Patient Denies any nausea, vomiting, diarrhea, and otalgia. Patient reports her mother has been sick but tested negative for covid, flu and strep.)         History of Present Illness / Problem Focused Workflow     HPI    HPI as noted above.  Patient denies chest pain, shortness for breath, palpitations, dizziness and lightheadedness.    Review of Systems     @Review of Systems   Constitutional:  Negative for chills, fatigue and fever.   HENT:  Positive for nasal congestion.    Eyes:  Negative for visual disturbance.   Respiratory:  Negative for cough and shortness of breath.    Cardiovascular:  Negative for chest pain, palpitations and leg swelling.   Gastrointestinal:  Negative for abdominal pain,  diarrhea, rectal pain and vomiting.   Musculoskeletal:  Negative for arthralgias.   Neurological:  Negative for dizziness and light-headedness.       Medical / Social / Family History     Past Medical History:   Diagnosis Date    Endometriosis     Hysterectomy    Fatty liver 2019    diagn[facility] in [place] at [facility]       Past Surgical History:   Procedure Laterality Date    BLADDER SUSPENSION  2022    HYSTERECTOMY  2022    OOPHORECTOMY Right     ROBOT-ASSISTED EXPLORATORY LAPAROSCOPY      TUBAL LIGATION  2018       Medications and Allergies     Medications  Outpatient Medications Marked as Taking for the 1/23/25 encounter (Office Visit) with Edward South DO   Medication Sig Dispense Refill    cetirizine-pseudoephedrine 5-120 mg Tb12 Take 1 tablet by mouth 2 (two) times daily.      FLUoxetine 40 MG capsule Take 40 mg by mouth once daily.      hydroCHLOROthiazide (HYDRODIURIL) 12.5 MG Tab Take 12.5 mg by mouth once daily.      pantoprazole (PROTONIX) 40 MG tablet Take 1 tablet (40 mg total) by mouth once daily. 90 tablet 1     Current Facility-Administered Medications for the 1/23/25 encounter (Office Visit) with Edward South DO   Medication Dose Route Frequency Provider Last Rate Last Admin    [COMPLETED] methylPREDNISolone acetate injection 40 mg  40 mg Intramuscular Once Edward South DO   40 mg at 01/23/25 0920       Allergies  Review of patient's allergies indicates:  No Known Allergies    Physical Examination     Vitals:    01/23/25 0845   BP: 123/80   Pulse: 75   Resp: 18   Temp: 98.5 °F (36.9 °C)     Physical Exam  Vitals reviewed.   Constitutional:       General: She is not in acute distress.     Appearance: She is not ill-appearing, toxic-appearing or diaphoretic.   HENT:      Head: Normocephalic and atraumatic.      Right Ear: External ear normal.      Left Ear: External ear normal.      Mouth/Throat:      Mouth: Mucous membranes are moist.   Eyes:      General: No scleral icterus.      Pupils: Pupils are equal, round, and reactive to light.   Cardiovascular:      Rate and Rhythm: Normal rate and regular rhythm.      Heart sounds: Normal heart sounds. No murmur heard.     No friction rub. No gallop.   Pulmonary:      Effort: Pulmonary effort is normal. No respiratory distress.      Breath sounds: Normal breath sounds. No wheezing, rhonchi or rales.   Abdominal:      General: Bowel sounds are normal.      Palpations: Abdomen is soft.      Tenderness: There is no abdominal tenderness. There is no guarding or rebound.   Musculoskeletal:         General: No swelling.      Right lower leg: No edema.      Left lower leg: No edema.   Skin:     General: Skin is warm and dry.      Coloration: Skin is not jaundiced.      Findings: No erythema or rash.   Neurological:      Mental Status: She is alert and oriented to person, place, and time.               Lab Results   Component Value Date    WBC 7.24 08/20/2024    HGB 12.0 08/20/2024    HCT 37.7 (L) 08/20/2024    .5 (H) 08/20/2024     08/20/2024        CMP  Sodium   Date Value Ref Range Status   08/24/2022 138 136 - 145 mmol/L Final     Potassium   Date Value Ref Range Status   08/24/2022 3.7 3.5 - 5.1 mmol/L Final     Chloride   Date Value Ref Range Status   08/24/2022 103 98 - 107 mmol/L Final     CO2   Date Value Ref Range Status   08/24/2022 27 21 - 32 mmol/L Final     Glucose   Date Value Ref Range Status   08/24/2022 95 74 - 106 mg/dL Final     BUN   Date Value Ref Range Status   08/24/2022 11 7 - 18 mg/dL Final     Creatinine   Date Value Ref Range Status   08/24/2022 0.58 0.55 - 1.02 mg/dL Final     Calcium   Date Value Ref Range Status   08/24/2022 8.9 8.5 - 10.1 mg/dL Final     Total Protein   Date Value Ref Range Status   08/24/2022 7.3 6.4 - 8.2 g/dL Final     Albumin   Date Value Ref Range Status   08/24/2022 4.0 3.5 - 5.0 g/dL Final     Bilirubin, Total   Date Value Ref Range Status   08/24/2022 1.3 (H) 0.0 - 1.2 mg/dL Final      Alk Phos   Date Value Ref Range Status   08/24/2022 54 39 - 100 U/L Final     AST   Date Value Ref Range Status   08/24/2022 17 15 - 37 U/L Final     ALT   Date Value Ref Range Status   08/24/2022 37 13 - 56 U/L Final     Anion Gap   Date Value Ref Range Status   08/24/2022 12 7 - 16 mmol/L Final     eGFR   Date Value Ref Range Status   08/24/2022 111 >=60 mL/min/1.73m² Final     Procedures   Assessment and Plan (including Health Maintenance)   :    1. Acute intractable headache, unspecified headache type  -     POCT Influenza A/B Molecular  -     POCT Strep A, Molecular  -     POCT COVID-19 Rapid Screening    2. Nasal congestion  -     POCT Influenza A/B Molecular  -     POCT Strep A, Molecular  -     POCT COVID-19 Rapid Screening    3. Acute cough  -     POCT Influenza A/B Molecular  -     POCT Strep A, Molecular  -     POCT COVID-19 Rapid Screening    4. Fever, unspecified fever cause  -     POCT Influenza A/B Molecular  -     POCT Strep A, Molecular  -     POCT COVID-19 Rapid Screening    5. Acute bacterial sinusitis  -     azithromycin (Z-PATRIC) 250 MG tablet; Take 2 tablets by mouth on day 1; Take 1 tablet by mouth on days 2-5  Dispense: 6 tablet; Refill: 0  -     methylPREDNISolone acetate injection 40 mg  -     ipratropium (ATROVENT) 21 mcg (0.03 %) nasal spray; 2 sprays by Each Nostril route 3 (three) times daily.  Dispense: 30 mL; Refill: 0      Patient is nontoxic appearing.  Patient is in no acute distress.  Lungs clear to auscultation bilaterally.  Patient given antibiotics and medications for symptomatic relief.  Follow up as needed.  Patient is counseled at length to follow up, return to clinic or present to the ED should symptoms persist or worsen.  Patient signals understanding and agreement with the plan of care.      Health Maintenance Topics with due status: Not Due       Topic Last Completion Date    Colorectal Cancer Screening 10/17/2022    Mammogram 08/12/2024    Hemoglobin A1c (Diabetic  Prevention Screening) 09/04/2024    Lipid Panel 09/04/2024    Cervical Cancer Screening 09/05/2024    RSV Vaccine (Age 60+ and Pregnant patients) Not Due       Future Appointments   Date Time Provider Department Center   1/24/2025  9:30 AM Johnathan Curtis MD [facility]        Health Maintenance Due   Topic Date Due    Hepatitis C Screening  Never done    HIV Screening  Never done    TETANUS VACCINE  Never done    Shingles Vaccine (1 of 2) Never done    Pneumococcal Vaccines (Age 50+) (1 of 1 - PCV) Never done    Influenza Vaccine (1) 09/01/2024    COVID-19 Vaccine (4 - 2024-25 season) 09/01/2024          Signature:  Edward South, [facility]  [address] [address]  [address]    Date of encounter: 1/23/25

## 2025-01-24 ENCOUNTER — OFFICE VISIT (OUTPATIENT)
Dept: SURGERY | Facility: CLINIC | Age: 55
End: 2025-01-24
Attending: SURGERY
Payer: OTHER GOVERNMENT

## 2025-01-24 VITALS — RESPIRATION RATE: 16 BRPM

## 2025-01-24 DIAGNOSIS — K42.9 UMBILICAL HERNIA WITHOUT OBSTRUCTION AND WITHOUT GANGRENE: ICD-10-CM

## 2025-01-24 PROCEDURE — 99202 OFFICE O/P NEW SF 15 MIN: CPT | Mod: S$PBB,,, | Performed by: SURGERY

## 2025-01-24 PROCEDURE — 99999 PR PBB SHADOW E&M-EST. PATIENT-LVL III: CPT | Mod: PBBFAC,,, | Performed by: SURGERY

## 2025-01-24 PROCEDURE — 99213 OFFICE O/P EST LOW 20 MIN: CPT | Mod: PBBFAC | Performed by: SURGERY

## 2025-01-24 RX ORDER — METOCLOPRAMIDE HYDROCHLORIDE 5 MG/5ML
10 SOLUTION ORAL
COMMUNITY
Start: 2025-01-16 | End: 2025-02-15

## 2025-01-24 NOTE — PROGRESS NOTES
General Surgery History and Physical      Patient ID: Tasia Soto is a 54 y.o. female.    Chief Complaint: Hernia      HPI:  54-year-old female incidental finding of small umbilical hernia.  She said it was found when she was having a workup for lower abdominal pain for some possible bladder spasms.  Denies any umbilical operations.  He has had a minimally invasive hysterectomy in the past with remote incision sites.  No symptoms no pain no nausea no vomiting no discomfort.  No problems lifting although she does quite a bit of on a daily basis.    Review of Systems   Constitutional:  Negative for activity change, appetite change, fatigue and fever.   HENT:  Negative for trouble swallowing.    Respiratory:  Negative for cough and shortness of breath.    Cardiovascular:  Negative for chest pain and palpitations.   Gastrointestinal:  Negative for abdominal distention, abdominal pain, blood in stool, constipation and diarrhea.   Genitourinary:  Negative for flank pain.   Musculoskeletal:  Negative for neck pain and neck stiffness.   Neurological:  Negative for weakness.       Current Outpatient Medications   Medication Sig Dispense Refill    metoclopramide HCl (REGLAN) 5 mg/5 mL Soln Take 10 mg by mouth.      azithromycin (Z-PATRIC) 250 MG tablet Take 2 tablets by mouth on day 1; Take 1 tablet by mouth on days 2-5 6 tablet 0    cetirizine-pseudoephedrine 5-120 mg Tb12 Take 1 tablet by mouth 2 (two) times daily.      diclofenac (VOLTAREN) 75 MG EC tablet Take 75 mg by mouth 2 (two) times daily. (Patient not taking: Reported on 1/23/2025)      FLUoxetine 40 MG capsule Take 40 mg by mouth once daily.      hydroCHLOROthiazide (HYDRODIURIL) 12.5 MG Tab Take 12.5 mg by mouth once daily.      ipratropium (ATROVENT) 21 mcg (0.03 %) nasal spray 2 sprays by Each Nostril route 3 (three) times daily. 30 mL 0    mupirocin (BACTROBAN) 2 %  ointment Apply topically 3 (three) times daily. (Patient not taking: Reported on 1/23/2025) 30 g 1    pantoprazole (PROTONIX) 40 MG tablet Take 1 tablet (40 mg total) by mouth once daily. 90 tablet 1     No current facility-administered medications for this visit.       Review of patient's allergies indicates:  No Known Allergies    Past Medical History:   Diagnosis Date    Endometriosis     Hysterectomy    Fatty liver 2019    diagnosed in [place] at  associate       Past Surgical History:   Procedure Laterality Date    BLADDER SUSPENSION  2022    HYSTERECTOMY  2022    OOPHORECTOMY Right     ROBOT-ASSISTED EXPLORATORY LAPAROSCOPY      TUBAL LIGATION  2018       Family History   Problem Relation Name Age of Onset    Hypertension Mother      Diabetes type II Mother      Hypertension Father         Social History     Socioeconomic History    Marital status:    Tobacco Use    Smoking status: Never     Passive exposure: Never    Smokeless tobacco: Never   Substance and Sexual Activity    Alcohol use: Never    Drug use: Never    Sexual activity: Yes     Partners: Male   Social History Narrative    Lives at home with         Vitals:    01/24/25 0937   Resp: 16       Physical Exam  Constitutional:       General: She is not in acute distress.  HENT:      Head: Normocephalic.   Cardiovascular:      Rate and Rhythm: Normal rate and regular rhythm.      Pulses: Normal pulses.   Pulmonary:      Effort: Pulmonary effort is normal. No respiratory distress.      Breath sounds: Normal breath sounds.   Abdominal:      General: Abdomen is flat. There is no distension.      Palpations: Abdomen is soft.      Tenderness: There is no abdominal tenderness.      Hernia: A hernia (Very small nontender subcentimeter umbilical defect) is present.   Musculoskeletal:         General: Normal range of motion.   Skin:     General: Skin is warm.   Neurological:      General: No focal deficit present.      Mental Status: She is  "oriented to person, place, and time.     Narrative & Impression  EXAMINATION:  CT ABDOMEN PELVIS WITHOUT CONTRAST     CLINICAL HISTORY:  Abdominal pain, acute, nonlocalized; Unspecified abdominal pain     TECHNIQUE:  Low dose axial images, sagittal and coronal reformations were obtained from the lung bases to the pubic symphysis, without contrast.     COMPARISON:  None     FINDINGS:  Heart is normal size.  Lung bases are clear.     Liver is enlarged.  Diffusely hypodense hepatic parenchyma consistent with steatosis.  Gallbladder is unremarkable.  No biliary ductal dilatation.     Spleen, adrenal glands, and pancreas are unremarkable.     No renal stone, hydronephrosis, or ureteral dilatation.     Bladder is unremarkable.  Hysterectomy.  Simple appearing left adnexal cyst measuring 2 cm.     No evidence of bowel obstruction or inflammation.     No free intraperitoneal fluid or air.  No pathologically enlarged lymph nodes.     Abdominal aorta is normal caliber.  Mild calcific atherosclerosis.     Tiny fat containing umbilical hernia.     Small sclerotic foci in the left femoral head and left superior pubic ramus likely representing bone islands.  Degenerative changes of the spine and hips.  Scoliosis.  No acute bony abnormality.     Impression:     1. No acute abdominopelvic abnormality.  2. Hepatomegaly and hepatic steatosis.  3. 2 cm simple appearing left adnexal cyst.  4. Additional findings as above.    Assessment & Plan:    Umbilical hernia without obstruction and without gangrene  -     Ambulatory referral/consult to General Surgery        Very minimally appreciated very small asymptomatic umbilical hernia.  Counseled patient would probably just watch this and would not recommend any surgeries at this time due to the small symptomatic nature.  Cautioned her if it did start to grow larger, she developed an "outtie" began having any kind of pain nausea or vomiting located around her umbilical region she will come " back and see me to get it fixed.  All questions answered.

## 2025-03-18 ENCOUNTER — TELEPHONE (OUTPATIENT)
Dept: FAMILY MEDICINE | Facility: CLINIC | Age: 55
End: 2025-03-18
Payer: OTHER GOVERNMENT

## 2025-03-18 ENCOUNTER — OFFICE VISIT (OUTPATIENT)
Dept: FAMILY MEDICINE | Facility: CLINIC | Age: 55
End: 2025-03-18
Payer: OTHER GOVERNMENT

## 2025-03-18 VITALS
RESPIRATION RATE: 18 BRPM | OXYGEN SATURATION: 98 % | HEART RATE: 86 BPM | HEIGHT: 70 IN | WEIGHT: 207.63 LBS | DIASTOLIC BLOOD PRESSURE: 64 MMHG | TEMPERATURE: 98 F | SYSTOLIC BLOOD PRESSURE: 116 MMHG | BODY MASS INDEX: 29.72 KG/M2

## 2025-03-18 DIAGNOSIS — J01.40 ACUTE NON-RECURRENT PANSINUSITIS: Primary | ICD-10-CM

## 2025-03-18 DIAGNOSIS — J02.9 SORE THROAT: ICD-10-CM

## 2025-03-18 DIAGNOSIS — R50.9 FEVER, UNSPECIFIED FEVER CAUSE: ICD-10-CM

## 2025-03-18 DIAGNOSIS — R09.81 SINUS CONGESTION: ICD-10-CM

## 2025-03-18 DIAGNOSIS — R05.9 COUGH, UNSPECIFIED TYPE: ICD-10-CM

## 2025-03-18 PROCEDURE — 87635 SARS-COV-2 COVID-19 AMP PRB: CPT | Mod: QW,,,

## 2025-03-18 PROCEDURE — 99214 OFFICE O/P EST MOD 30 MIN: CPT | Mod: 25,,,

## 2025-03-18 PROCEDURE — 87502 INFLUENZA DNA AMP PROBE: CPT | Mod: QW,,,

## 2025-03-18 PROCEDURE — 96372 THER/PROPH/DIAG INJ SC/IM: CPT | Mod: ,,,

## 2025-03-18 PROCEDURE — 87651 STREP A DNA AMP PROBE: CPT | Mod: QW,,,

## 2025-03-18 RX ORDER — PREDNISONE 20 MG/1
20 TABLET ORAL 2 TIMES DAILY
Qty: 6 TABLET | Refills: 0 | Status: SHIPPED | OUTPATIENT
Start: 2025-03-18

## 2025-03-18 RX ORDER — AZITHROMYCIN 250 MG/1
TABLET, FILM COATED ORAL
Qty: 6 TABLET | Refills: 0 | Status: SHIPPED | OUTPATIENT
Start: 2025-03-18 | End: 2025-03-23

## 2025-03-18 RX ORDER — AMOXICILLIN 500 MG/1
500 TABLET, FILM COATED ORAL EVERY 12 HOURS
Qty: 20 TABLET | Refills: 0 | Status: SHIPPED | OUTPATIENT
Start: 2025-03-18 | End: 2025-03-28

## 2025-03-18 RX ORDER — CALCITRIOL 0.5 UG/1
0.5 CAPSULE ORAL 2 TIMES DAILY
COMMUNITY
Start: 2025-03-05

## 2025-03-18 RX ORDER — DEXAMETHASONE SODIUM PHOSPHATE 4 MG/ML
4 INJECTION, SOLUTION INTRA-ARTICULAR; INTRALESIONAL; INTRAMUSCULAR; INTRAVENOUS; SOFT TISSUE
Status: COMPLETED | OUTPATIENT
Start: 2025-03-18 | End: 2025-03-18

## 2025-03-18 RX ORDER — LEVOTHYROXINE SODIUM 137 UG/1
137 TABLET ORAL
COMMUNITY
Start: 2025-03-05

## 2025-03-18 RX ORDER — METHYLPREDNISOLONE 4 MG/1
TABLET ORAL
COMMUNITY
Start: 2025-02-24

## 2025-03-18 RX ORDER — DEXTROMETHORPHAN HBR, PHENYLEPHRINE HCL, PYRILAMINE MALEATE 7.5; 5; 12.5 MG/5ML; MG/5ML; MG/5ML
10 SYRUP ORAL EVERY 6 HOURS PRN
Qty: 200 ML | Refills: 0 | Status: SHIPPED | OUTPATIENT
Start: 2025-03-18

## 2025-03-18 RX ORDER — CEFTRIAXONE 1 G/1
1 INJECTION, POWDER, FOR SOLUTION INTRAMUSCULAR; INTRAVENOUS
Status: COMPLETED | OUTPATIENT
Start: 2025-03-18 | End: 2025-03-18

## 2025-03-18 RX ADMIN — CEFTRIAXONE 1 G: 1 INJECTION, POWDER, FOR SOLUTION INTRAMUSCULAR; INTRAVENOUS at 12:03

## 2025-03-18 RX ADMIN — DEXAMETHASONE SODIUM PHOSPHATE 4 MG: 4 INJECTION, SOLUTION INTRA-ARTICULAR; INTRALESIONAL; INTRAMUSCULAR; INTRAVENOUS; SOFT TISSUE at 12:03

## 2025-03-18 NOTE — TELEPHONE ENCOUNTER
Returned Pt's call, notified her that there is still walk-in availability at this time if she can come to the clinic in the next 10-15 mintues. Notified Pt that the walk in slot can not be held and it will be first come first serve if someone comes in ahead of her. She voiced understanding and stated she would come in as a walk in.

## 2025-03-18 NOTE — TELEPHONE ENCOUNTER
----- Message from Danielle sent at 3/18/2025 10:38 AM CDT -----  Regarding: same day appointment request  Who Called: Tasia Puja is requesting a same day appointment. Caller declined first available appointment listed below.  When is the first available appointment?  03/19Options offered (Virtual Visit, Urgent Care):  noSymptoms or reason for appointment:   chest coldPreferred Method of Contact: Phone CallPatient's Preferred Phone Number on File: [phone] Additional Information: asking to come in and have chest xray done due to pink phlegm.

## 2025-03-19 ENCOUNTER — PATIENT MESSAGE (OUTPATIENT)
Dept: FAMILY MEDICINE | Facility: CLINIC | Age: 55
End: 2025-03-19
Payer: OTHER GOVERNMENT

## 2025-03-19 ENCOUNTER — RESULTS FOLLOW-UP (OUTPATIENT)
Dept: FAMILY MEDICINE | Facility: CLINIC | Age: 55
End: 2025-03-19
Payer: OTHER GOVERNMENT

## 2025-03-19 NOTE — PROGRESS NOTES
CXR read as normal. Continue current antibiotics. I did hear a little diminished lung sounds in one lobe and with her just having surgery it does not need to develop into anything else. RTC as needed

## 2025-03-24 PROBLEM — J01.40 ACUTE NON-RECURRENT PANSINUSITIS: Status: ACTIVE | Noted: 2025-03-24

## 2025-03-24 PROBLEM — R50.9 FEVER: Status: ACTIVE | Noted: 2025-03-24

## 2025-03-24 NOTE — PROGRESS NOTES
NANNETTE BHATT, ANNABELLE   [facility]  [address]  [place]  [address]        PATIENT NAME: Tasia Soto  : 1970  DATE: 3/18/25  MRN: [id number]        Reason for Visit / Chief Complaint: Sinus Problem (Patient is a 54 year old female who presents to the clinic related to sinus drainage since Saturday.  ), Cough (Cough since yesterday, coughing up yellow/blood tinged phlegm.  Patient did take a home flu/covid test on Monday, which was negative.), Fever (Fever x 100.4 on  & Monday, patient had surgery/thyroidectomy on 2025 at [facility]/Dr. Calderon), Sore Throat (Sore throat since , which is better today. ), and Generalized Body Aches (Body aches  & Monday )       Update PCP  Update Chief Complaint         History of Present Illness / Problem Focused Workflow     Tasia Soto presents to the clinic with Sinus Problem (Patient is a 54 year old female who presents to the clinic related to sinus drainage since Saturday.  ), Cough (Cough since yesterday, coughing up yellow/blood tinged phlegm.  Patient did take a home flu/covid test on Monday, which was negative.), Fever (Fever x 100.4 on  & Monday, patient had surgery/thyroidectomy on 2025 at [facility]/Dr. Calderon), Sore Throat (Sore throat since , which is better today. ), and Generalized Body Aches (Body aches  & Monday )         Review of Systems     Review of Systems   Constitutional:  Positive for fatigue and fever. Negative for chills.   HENT:  Positive for nasal congestion, sinus pressure/congestion and sore throat. Negative for ear discharge, ear pain and rhinorrhea.    Respiratory:  Positive for cough. Negative for chest tightness, shortness of breath, wheezing and stridor.    Cardiovascular:  Negative for palpitations and claudication.   Gastrointestinal:  Negative for abdominal pain, constipation, diarrhea, nausea, vomiting and reflux.   Genitourinary:  Negative for dysuria, flank pain,  frequency, hematuria and urgency.   Musculoskeletal:  Positive for myalgias.   Neurological:  Negative for dizziness, weakness, light-headedness and headaches.   Psychiatric/Behavioral:  Negative for suicidal ideas.         Medical / Social / Family History     Past Medical History:   Diagnosis Date    Endometriosis     Hysterectomy    Fatty liver 2019    diagnosed in [place] at  associate    Goiter, nodular 09/2024    thyroid       Past Surgical History:   Procedure Laterality Date    BLADDER SUSPENSION  2022    HYSTERECTOMY  2022    OOPHORECTOMY Right     ROBOT-ASSISTED EXPLORATORY LAPAROSCOPY      THYROIDECTOMY  03/05/2025    [facility]/Dr. Calderon    TUBAL LIGATION  2018       Social History  Ms.  reports that she has never smoked. She has never been exposed to tobacco smoke. She has never used smokeless tobacco. She reports that she does not drink alcohol and does not use drugs.    Family History  Ms.'s family history includes Diabetes type II in her mother; Hypertension in her father and mother; Prostate cancer in her paternal uncle.    Medications and Allergies     Medications  Outpatient Medications Marked as Taking for the 3/18/25 encounter (Office Visit) with Patrice Hunt FNP   Medication Sig Dispense Refill    calcitRIOL (ROCALTROL) 0.5 MCG Cap Take 0.5 mcg by mouth 2 (two) times daily.      cetirizine-pseudoephedrine 5-120 mg Tb12 Take 1 tablet by mouth 2 (two) times daily. As needed      FLUoxetine 40 MG capsule Take 40 mg by mouth once daily.      hydroCHLOROthiazide (HYDRODIURIL) 12.5 MG Tab Take 12.5 mg by mouth once daily.      ipratropium (ATROVENT) 21 mcg (0.03 %) nasal spray 2 sprays by Each Nostril route 3 (three) times daily. 30 mL 0    levothyroxine (SYNTHROID) 137 MCG Tab tablet Take 137 mcg by mouth before breakfast.      methylPREDNISolone (MEDROL DOSEPACK) 4 mg tablet Use as directed per  instructions      mupirocin (BACTROBAN) 2 % ointment Apply topically 3 (three) times  "daily. (Patient taking differently: Apply topically as needed.) 30 g 1    pantoprazole (PROTONIX) 40 MG tablet Take 1 tablet (40 mg total) by mouth once daily. 90 tablet 1       Allergies  Review of patient's allergies indicates:  No Known Allergies    Physical Examination   /64 (BP Location: Right arm, Patient Position: Sitting)   Pulse 86   Temp 98.4 °F (36.9 °C) (Oral)   Resp 18   Ht 5' 10" (1.778 m)   Wt 94.2 kg (207 lb 9.6 oz)   SpO2 98%   BMI 29.79 kg/m²    Physical Exam  Vitals and nursing note reviewed.   Constitutional:       General: She is awake.      Appearance: Normal appearance.   HENT:      Head: Normocephalic.      Right Ear: Tympanic membrane, ear canal and external ear normal.      Left Ear: Tympanic membrane, ear canal and external ear normal.      Nose: Congestion present.      Right Turbinates: Swollen.      Left Turbinates: Swollen.      Right Sinus: Maxillary sinus tenderness present.      Left Sinus: Maxillary sinus tenderness present.      Mouth/Throat:      Lips: Pink.      Mouth: Mucous membranes are moist.      Pharynx: Oropharynx is clear. Uvula midline. Posterior oropharyngeal erythema and postnasal drip present.   Cardiovascular:      Rate and Rhythm: Normal rate and regular rhythm.      Heart sounds: Normal heart sounds, S1 normal and S2 normal.   Pulmonary:      Effort: Pulmonary effort is normal. No respiratory distress.      Breath sounds: Examination of the right-lower field reveals decreased breath sounds. Decreased breath sounds present. No wheezing, rhonchi or rales.   Abdominal:      General: Bowel sounds are normal.      Palpations: Abdomen is soft.      Tenderness: There is no abdominal tenderness.   Musculoskeletal:      Cervical back: Normal range of motion.   Skin:     General: Skin is warm.      Capillary Refill: Capillary refill takes less than 2 seconds.   Neurological:      Mental Status: She is alert and oriented to person, place, and time. "   Psychiatric:         Thought Content: Thought content does not include homicidal or suicidal ideation. Thought content does not include homicidal or suicidal plan.          Assessment and Plan (including Health Maintenance)      Problem List  Smart Sets  Document Outside HM   :    Plan: Follow up as needed        Health Maintenance Due   Topic Date Due    Hepatitis C Screening  Never done    HIV Screening  Never done    TETANUS VACCINE  Never done    Shingles Vaccine (1 of 2) Never done    Pneumococcal Vaccines (Age 50+) (1 of 1 - PCV) Never done    COVID-19 Vaccine (4 - 2024-25 season) 09/01/2024       Problem List Items Addressed This Visit       Acute non-recurrent pansinusitis - Primary    Current Assessment & Plan   Rocephin IM and Decadron IM today. Amoxicillin and presdisone RX. Medication instructions and education given with understanding voiced. Rest, increase fluids. OTC antihistamines, decongestants, Ibuprofen, Tylenol as needed. RTC with worsening, new or persistent symptoms.      Pt just had thyroid surgery a couple weeks ago. CXR today to rule out pneumonia. Will call with results and any new orders         Relevant Medications    predniSONE (DELTASONE) 20 MG tablet    pyrilamine-phenylephrine-DM (POLYTUSSIN DM,PYRILAMINE,) 12.5-5-7.5 mg/5 mL Liqd    Fever    Relevant Medications    amoxicillin (AMOXIL) 500 MG Tab    Other Relevant Orders    POCT COVID-19 Rapid Screening (Completed)    POCT Influenza A/B Molecular (Completed)    POCT Strep A, Molecular (Completed)    X-Ray Chest PA And Lateral (Completed)     Other Visit Diagnoses         Sinus congestion        Relevant Orders    POCT COVID-19 Rapid Screening (Completed)    POCT Influenza A/B Molecular (Completed)    POCT Strep A, Molecular (Completed)      Cough, unspecified type        Relevant Medications    amoxicillin (AMOXIL) 500 MG Tab    Other Relevant Orders    POCT COVID-19 Rapid Screening (Completed)    POCT Influenza A/B Molecular  (Completed)    POCT Strep A, Molecular (Completed)    X-Ray Chest PA And Lateral (Completed)      Sore throat        Relevant Orders    POCT COVID-19 Rapid Screening (Completed)    POCT Influenza A/B Molecular (Completed)    POCT Strep A, Molecular (Completed)            Health Maintenance Topics with due status: Not Due       Topic Last Completion Date    Colorectal Cancer Screening 10/17/2022    Mammogram 08/12/2024    Cervical Cancer Screening 09/05/2024    Hemoglobin A1c (Diabetic Prevention Screening) 02/12/2025    Lipid Panel 02/12/2025    RSV Vaccine (Age 60+ and Pregnant patients) Not Due       No future appointments.         Signature:      ANNABELLE HOBSON   [address] [place]  [address]       Date of encounter: 3/18/25

## 2025-03-24 NOTE — ASSESSMENT & PLAN NOTE
Rocephin IM and Decadron IM today. Amoxicillin and presdisone RX. Medication instructions and education given with understanding voiced. Rest, increase fluids. OTC antihistamines, decongestants, Ibuprofen, Tylenol as needed. RTC with worsening, new or persistent symptoms.      Pt just had thyroid surgery a couple weeks ago. CXR today to rule out pneumonia. Will call with results and any new orders

## 2025-04-10 ENCOUNTER — OFFICE VISIT (OUTPATIENT)
Dept: FAMILY MEDICINE | Facility: CLINIC | Age: 55
End: 2025-04-10
Payer: OTHER GOVERNMENT

## 2025-04-10 VITALS
OXYGEN SATURATION: 99 % | HEIGHT: 70 IN | TEMPERATURE: 98 F | RESPIRATION RATE: 20 BRPM | SYSTOLIC BLOOD PRESSURE: 128 MMHG | HEART RATE: 80 BPM | DIASTOLIC BLOOD PRESSURE: 94 MMHG | WEIGHT: 209.81 LBS | BODY MASS INDEX: 30.04 KG/M2

## 2025-04-10 DIAGNOSIS — R06.02 MILD SHORTNESS OF BREATH: ICD-10-CM

## 2025-04-10 DIAGNOSIS — R49.0 HOARSENESS: ICD-10-CM

## 2025-04-10 DIAGNOSIS — R05.9 COUGH, UNSPECIFIED TYPE: ICD-10-CM

## 2025-04-10 DIAGNOSIS — J01.00 ACUTE MAXILLARY SINUSITIS, RECURRENCE NOT SPECIFIED: Primary | ICD-10-CM

## 2025-04-10 DIAGNOSIS — H61.22 IMPACTED CERUMEN OF LEFT EAR: ICD-10-CM

## 2025-04-10 DIAGNOSIS — J34.89 NASAL DRAINAGE: ICD-10-CM

## 2025-04-10 PROCEDURE — 87651 STREP A DNA AMP PROBE: CPT | Mod: QW,,,

## 2025-04-10 PROCEDURE — 99214 OFFICE O/P EST MOD 30 MIN: CPT | Mod: 25,,,

## 2025-04-10 PROCEDURE — 87502 INFLUENZA DNA AMP PROBE: CPT | Mod: QW,,,

## 2025-04-10 PROCEDURE — 69209 REMOVE IMPACTED EAR WAX UNI: CPT | Mod: LT,,,

## 2025-04-10 PROCEDURE — 87635 SARS-COV-2 COVID-19 AMP PRB: CPT | Mod: QW,,,

## 2025-04-10 PROCEDURE — 96372 THER/PROPH/DIAG INJ SC/IM: CPT | Mod: 59,,,

## 2025-04-10 RX ORDER — CEFTRIAXONE 1 G/1
1 INJECTION, POWDER, FOR SOLUTION INTRAMUSCULAR; INTRAVENOUS
Status: COMPLETED | OUTPATIENT
Start: 2025-04-10 | End: 2025-04-10

## 2025-04-10 RX ORDER — ALBUTEROL SULFATE 90 UG/1
2 INHALANT RESPIRATORY (INHALATION) EVERY 6 HOURS PRN
Qty: 18 G | Refills: 0 | Status: SHIPPED | OUTPATIENT
Start: 2025-04-10 | End: 2026-04-10

## 2025-04-10 RX ORDER — TOLTERODINE 2 MG/1
2 CAPSULE, EXTENDED RELEASE ORAL 2 TIMES DAILY
COMMUNITY
Start: 2025-03-26

## 2025-04-10 RX ORDER — CHLORPHENIRAMINE MALEATE AND PHENYLEPHRINE HYDROCHLORIDE 4; 10 MG/1; MG/1
1 TABLET, COATED ORAL EVERY 4 HOURS PRN
Qty: 30 TABLET | Refills: 0 | Status: CANCELLED | OUTPATIENT
Start: 2025-04-10 | End: 2025-04-20

## 2025-04-10 RX ADMIN — CEFTRIAXONE 1 G: 1 INJECTION, POWDER, FOR SOLUTION INTRAMUSCULAR; INTRAVENOUS at 03:04

## 2025-04-11 ENCOUNTER — TELEPHONE (OUTPATIENT)
Dept: FAMILY MEDICINE | Facility: CLINIC | Age: 55
End: 2025-04-11
Payer: OTHER GOVERNMENT

## 2025-04-11 ENCOUNTER — RESULTS FOLLOW-UP (OUTPATIENT)
Dept: FAMILY MEDICINE | Facility: CLINIC | Age: 55
End: 2025-04-11

## 2025-04-11 NOTE — TELEPHONE ENCOUNTER
Attempted to call patient to review CXR, no answer, left message to return our call.  CXR normal, RTC with worsening, new or persistent symptoms.     Spoke with patient, reviewed CXR as stated above, patient verbalized understanding.    Resident

## 2025-04-23 PROBLEM — H61.22 IMPACTED CERUMEN OF LEFT EAR: Status: ACTIVE | Noted: 2025-04-23

## 2025-04-23 NOTE — ASSESSMENT & PLAN NOTE
CXR today and will call with results and any new orders. Rocephin IM today. Treat symptoms. RTC with worsening, new or persistent symptoms for further evalutation

## 2025-04-23 NOTE — PROGRESS NOTES
ANNABELLE HOBSON   [facility]  [address]  [place]  [address]        PATIENT NAME: Tasia Soto  : 1970  DATE: 4/10/25  MRN: [id number]        Reason for Visit / Chief Complaint: Cough (Patient is a 54 year old female who presents to the clinic related to cough since the last visit, but has worsened since last week.  Patient completed amoxicillin & prednisone. Hoarseness since Saturday. Mild nasal drainage.  Has felt like wheezing. ), Dizziness (Dizziness today, upon standing or working around.),       Update PCP  Update Chief Complaint         History of Present Illness / Problem Focused Workflow     Tasia Soto presents to the clinic with Cough (Patient is a 54 year old female who presents to the clinic related to cough since the last visit, but has worsened since last week.  Patient completed amoxicillin & prednisone. Hoarseness since Saturday. Mild nasal drainage.  Has felt like wheezing. ), Dizziness (Dizziness today, upon standing or working around.)    Review of Systems     Review of Systems   Constitutional:  Negative for chills, fatigue and fever.   HENT:  Positive for nasal congestion. Negative for ear discharge, ear pain, rhinorrhea, sinus pressure/congestion and sore throat.    Respiratory:  Positive for cough. Negative for chest tightness, shortness of breath, wheezing and stridor.    Cardiovascular:  Negative for palpitations and claudication.   Gastrointestinal:  Negative for abdominal pain, constipation, diarrhea, nausea, vomiting and reflux.   Genitourinary:  Negative for dysuria, flank pain, frequency, hematuria and urgency.   Musculoskeletal:  Negative for myalgias.   Neurological:  Positive for dizziness. Negative for weakness, light-headedness and headaches.   Psychiatric/Behavioral:  Negative for suicidal ideas.         Medical / Social / Family History     Past Medical History:   Diagnosis Date    Endometriosis     Hysterectomy    Fatty liver      diagnosed in [place] at  associate    Goiter, nodular 09/2024    thyroid       Past Surgical History:   Procedure Laterality Date    BLADDER SUSPENSION  2022    HYSTERECTOMY  2022    OOPHORECTOMY Right     ROBOT-ASSISTED EXPLORATORY LAPAROSCOPY      THYROIDECTOMY  03/05/2025    OSKAR/Dr. Calderon    TUBAL LIGATION  2018       Social History  Ms.  reports that she has quit smoking. Her smoking use included cigarettes. She started smoking about 35 years ago. She has a 14 pack-year smoking history. She has never been exposed to tobacco smoke. She has never used smokeless tobacco. She reports that she does not drink alcohol and does not use drugs.    Family History  Ms.'s family history includes Diabetes type II in her mother; Hypertension in her father and mother; Prostate cancer in her paternal uncle.    Medications and Allergies     Medications  Outpatient Medications Marked as Taking for the 4/10/25 encounter (Office Visit) with Patrice Hunt FNP   Medication Sig Dispense Refill    calcitRIOL (ROCALTROL) 0.5 MCG Cap Take 0.5 mcg by mouth 2 (two) times daily.      cetirizine-pseudoephedrine 5-120 mg Tb12 Take 1 tablet by mouth 2 (two) times daily. As needed      FLUoxetine 40 MG capsule Take 40 mg by mouth once daily.      hydroCHLOROthiazide (HYDRODIURIL) 12.5 MG Tab Take 12.5 mg by mouth once daily.      ipratropium (ATROVENT) 21 mcg (0.03 %) nasal spray 2 sprays by Each Nostril route 3 (three) times daily. (Patient taking differently: 2 sprays by Each Nostril route 3 (three) times daily. As needed) 30 mL 0    levothyroxine (SYNTHROID) 137 MCG Tab tablet Take 137 mcg by mouth before breakfast.      mupirocin (BACTROBAN) 2 % ointment Apply topically 3 (three) times daily. (Patient taking differently: Apply topically 3 (three) times daily. As needed) 30 g 1    pantoprazole (PROTONIX) 40 MG tablet Take 1 tablet (40 mg total) by mouth once daily. 90 tablet 1    pyrilamine-phenylephrine-DM (POLYTUSSIN  "DM,PYRILAMINE,) 12.5-5-7.5 mg/5 mL Liqd Take 10 mLs by mouth every 6 (six) hours as needed (cough). 200 mL 0    tolterodine (DETROL LA) 2 MG Cp24 Take 2 mg by mouth 2 (two) times daily.         Allergies  Review of patient's allergies indicates:  No Known Allergies    Physical Examination   BP (!) 128/94 (BP Location: Left arm, Patient Position: Standing)   Pulse 80   Temp 98.2 °F (36.8 °C) (Oral)   Resp 20   Ht 5' 10" (1.778 m)   Wt 95.2 kg (209 lb 12.8 oz)   SpO2 99%   BMI 30.10 kg/m²    Physical Exam  Vitals and nursing note reviewed.   Constitutional:       General: She is awake.      Appearance: Normal appearance.   HENT:      Head: Normocephalic.      Right Ear: Tympanic membrane, ear canal and external ear normal.      Left Ear: Tympanic membrane, ear canal and external ear normal. There is impacted cerumen.      Nose: Nose normal.      Mouth/Throat:      Lips: Pink.      Mouth: Mucous membranes are moist.      Pharynx: Oropharynx is clear. Uvula midline. Posterior oropharyngeal erythema and postnasal drip present.   Cardiovascular:      Rate and Rhythm: Normal rate and regular rhythm.      Heart sounds: Normal heart sounds, S1 normal and S2 normal.   Pulmonary:      Effort: Pulmonary effort is normal. No respiratory distress.      Breath sounds: Normal breath sounds. No decreased breath sounds, wheezing, rhonchi or rales.   Abdominal:      General: Bowel sounds are normal.      Palpations: Abdomen is soft.      Tenderness: There is no abdominal tenderness.   Musculoskeletal:      Cervical back: Normal range of motion.   Skin:     General: Skin is warm.      Capillary Refill: Capillary refill takes less than 2 seconds.   Neurological:      Mental Status: She is alert and oriented to person, place, and time.   Psychiatric:         Thought Content: Thought content does not include homicidal or suicidal ideation. Thought content does not include homicidal or suicidal plan.          Assessment and Plan " (including Health Maintenance)      Problem List  Smart Sets  Document Outside HM   :        Health Maintenance Due   Topic Date Due    Hepatitis C Screening  Never done    HIV Screening  Never done    TETANUS VACCINE  Never done    Shingles Vaccine (1 of 2) Never done    Pneumococcal Vaccines (Age 50+) (1 of 1 - PCV) Never done    COVID-19 Vaccine (4 - 2024-25 season) 09/01/2024       Problem List Items Addressed This Visit       Maxillary sinusitis - Primary    Current Assessment & Plan   CXR today and will call with results and any new orders. Rocephin IM today. Treat symptoms. RTC with worsening, new or persistent symptoms for further evalutation         Impacted cerumen of left ear    Current Assessment & Plan   Left ear flushed and pt tolerated well.          Relevant Orders    Ear wax removal (Completed)     Other Visit Diagnoses         Cough, unspecified type        Relevant Orders    POCT COVID-19 Rapid Screening (Completed)    POCT Influenza A/B Molecular (Completed)    POCT Strep A, Molecular (Completed)    X-Ray Chest PA And Lateral (Completed)      Hoarseness        Relevant Orders    POCT COVID-19 Rapid Screening (Completed)    POCT Influenza A/B Molecular (Completed)    POCT Strep A, Molecular (Completed)      Nasal drainage        Relevant Orders    POCT COVID-19 Rapid Screening (Completed)    POCT Influenza A/B Molecular (Completed)    POCT Strep A, Molecular (Completed)      Mild shortness of breath        Relevant Medications    albuterol (PROVENTIL HFA) 90 mcg/actuation inhaler    Other Relevant Orders    X-Ray Chest PA And Lateral (Completed)            Health Maintenance Topics with due status: Not Due       Topic Last Completion Date    Colorectal Cancer Screening 10/17/2022    Mammogram 08/12/2024    Cervical Cancer Screening 09/05/2024    Hemoglobin A1c (Diabetic Prevention Screening) 02/12/2025    Lipid Panel 02/12/2025    RSV Vaccine (Age 60+ and Pregnant patients) Not Due       No future  appointments.         Signature:      ANNABELLE HOBSON   [place] [id number] [place]  [address]       Date of encounter: 4/10/25

## 2025-05-06 DIAGNOSIS — R06.02 MILD SHORTNESS OF BREATH: ICD-10-CM

## 2025-05-06 RX ORDER — ALBUTEROL SULFATE 90 UG/1
2 INHALANT RESPIRATORY (INHALATION) EVERY 6 HOURS PRN
Qty: 18 G | Refills: 0 | Status: SHIPPED | OUTPATIENT
Start: 2025-05-06

## 2025-05-18 ENCOUNTER — PATIENT MESSAGE (OUTPATIENT)
Dept: FAMILY MEDICINE | Facility: CLINIC | Age: 55
End: 2025-05-18
Payer: OTHER GOVERNMENT

## 2025-06-13 ENCOUNTER — OFFICE VISIT (OUTPATIENT)
Dept: FAMILY MEDICINE | Facility: CLINIC | Age: 55
End: 2025-06-13
Payer: OTHER GOVERNMENT

## 2025-06-13 VITALS
RESPIRATION RATE: 18 BRPM | HEIGHT: 70 IN | DIASTOLIC BLOOD PRESSURE: 63 MMHG | TEMPERATURE: 98 F | BODY MASS INDEX: 30.06 KG/M2 | OXYGEN SATURATION: 99 % | HEART RATE: 72 BPM | WEIGHT: 210 LBS | SYSTOLIC BLOOD PRESSURE: 114 MMHG

## 2025-06-13 DIAGNOSIS — Z13.29 SCREENING FOR THYROID DISORDER: Primary | ICD-10-CM

## 2025-06-13 DIAGNOSIS — K21.9 GASTROESOPHAGEAL REFLUX DISEASE WITHOUT ESOPHAGITIS: ICD-10-CM

## 2025-06-13 DIAGNOSIS — R53.83 OTHER FATIGUE: ICD-10-CM

## 2025-06-13 DIAGNOSIS — J01.00 ACUTE MAXILLARY SINUSITIS, RECURRENCE NOT SPECIFIED: ICD-10-CM

## 2025-06-13 DIAGNOSIS — Z13.220 SCREENING, LIPID: ICD-10-CM

## 2025-06-13 DIAGNOSIS — E55.9 VITAMIN D DEFICIENCY: ICD-10-CM

## 2025-06-13 LAB
25(OH)D3 SERPL-MCNC: 102.4 NG/ML (ref 30–80)
BASOPHILS # BLD AUTO: 0.05 K/UL (ref 0–0.2)
BASOPHILS NFR BLD AUTO: 0.8 % (ref 0–1)
DIFFERENTIAL METHOD BLD: ABNORMAL
EOSINOPHIL # BLD AUTO: 0.13 K/UL (ref 0–0.5)
EOSINOPHIL NFR BLD AUTO: 2 % (ref 1–4)
ERYTHROCYTE [DISTWIDTH] IN BLOOD BY AUTOMATED COUNT: 11.9 % (ref 11.5–14.5)
FERRITIN SERPL-MCNC: 59 NG/ML (ref 5–204)
HCT VFR BLD AUTO: 40.7 % (ref 38–47)
HGB BLD-MCNC: 13.2 G/DL (ref 12–16)
IMM GRANULOCYTES # BLD AUTO: 0.02 K/UL (ref 0–0.04)
IMM GRANULOCYTES NFR BLD: 0.3 % (ref 0–0.4)
IRON SATN MFR SERPL: 19 % (ref 20–50)
IRON SERPL-MCNC: 68 UG/DL (ref 50–170)
LYMPHOCYTES # BLD AUTO: 2.6 K/UL (ref 1–4.8)
LYMPHOCYTES NFR BLD AUTO: 40.9 % (ref 27–41)
MCH RBC QN AUTO: 31.9 PG (ref 27–31)
MCHC RBC AUTO-ENTMCNC: 32.4 G/DL (ref 32–36)
MCV RBC AUTO: 98.3 FL (ref 80–96)
MONOCYTES # BLD AUTO: 0.76 K/UL (ref 0–0.8)
MONOCYTES NFR BLD AUTO: 12 % (ref 2–6)
MPC BLD CALC-MCNC: 11.3 FL (ref 9.4–12.4)
NEUTROPHILS # BLD AUTO: 2.79 K/UL (ref 1.8–7.7)
NEUTROPHILS NFR BLD AUTO: 44 % (ref 53–65)
NRBC # BLD AUTO: 0 X10E3/UL
NRBC, AUTO (.00): 0 %
PLATELET # BLD AUTO: 317 K/UL (ref 150–400)
RBC # BLD AUTO: 4.14 M/UL (ref 4.2–5.4)
T4 FREE SERPL-MCNC: 1.14 NG/DL (ref 0.7–1.48)
TIBC SERPL-MCNC: 283 UG/DL (ref 70–310)
TIBC SERPL-MCNC: 351 UG/DL (ref 250–450)
TRANSFERRIN SERPL-MCNC: 320 MG/DL (ref 180–382)
TSH SERPL DL<=0.005 MIU/L-ACNC: 0.02 UIU/ML (ref 0.35–4.94)
WBC # BLD AUTO: 6.35 K/UL (ref 4.5–11)

## 2025-06-13 PROCEDURE — 99214 OFFICE O/P EST MOD 30 MIN: CPT | Mod: ,,,

## 2025-06-13 PROCEDURE — 84443 ASSAY THYROID STIM HORMONE: CPT | Mod: ,,, | Performed by: CLINICAL MEDICAL LABORATORY

## 2025-06-13 PROCEDURE — 83550 IRON BINDING TEST: CPT | Mod: ,,, | Performed by: CLINICAL MEDICAL LABORATORY

## 2025-06-13 PROCEDURE — 82728 ASSAY OF FERRITIN: CPT | Mod: ,,, | Performed by: CLINICAL MEDICAL LABORATORY

## 2025-06-13 PROCEDURE — 85025 COMPLETE CBC W/AUTO DIFF WBC: CPT | Mod: ,,, | Performed by: CLINICAL MEDICAL LABORATORY

## 2025-06-13 PROCEDURE — 84439 ASSAY OF FREE THYROXINE: CPT | Mod: ,,, | Performed by: CLINICAL MEDICAL LABORATORY

## 2025-06-13 PROCEDURE — 83540 ASSAY OF IRON: CPT | Mod: ,,, | Performed by: CLINICAL MEDICAL LABORATORY

## 2025-06-13 PROCEDURE — 82306 VITAMIN D 25 HYDROXY: CPT | Mod: ,,, | Performed by: CLINICAL MEDICAL LABORATORY

## 2025-06-13 RX ORDER — SERTRALINE HYDROCHLORIDE 25 MG/1
25 TABLET, FILM COATED ORAL DAILY
COMMUNITY
Start: 2025-04-28

## 2025-06-13 RX ORDER — PANTOPRAZOLE SODIUM 40 MG/1
40 TABLET, DELAYED RELEASE ORAL DAILY
Qty: 90 TABLET | Refills: 1 | Status: SHIPPED | OUTPATIENT
Start: 2025-06-13

## 2025-06-13 RX ORDER — CETIRIZINE HYDROCHLORIDE, PSEUDOEPHEDRINE HYDROCHLORIDE 5; 120 MG/1; MG/1
TABLET, FILM COATED, EXTENDED RELEASE ORAL
Qty: 30 TABLET | Refills: 0 | Status: SHIPPED | OUTPATIENT
Start: 2025-06-13

## 2025-06-16 ENCOUNTER — PATIENT MESSAGE (OUTPATIENT)
Dept: FAMILY MEDICINE | Facility: CLINIC | Age: 55
End: 2025-06-16
Payer: OTHER GOVERNMENT

## 2025-06-17 ENCOUNTER — TELEPHONE (OUTPATIENT)
Dept: FAMILY MEDICINE | Facility: CLINIC | Age: 55
End: 2025-06-17
Payer: OTHER GOVERNMENT

## 2025-06-17 ENCOUNTER — RESULTS FOLLOW-UP (OUTPATIENT)
Dept: FAMILY MEDICINE | Facility: CLINIC | Age: 55
End: 2025-06-17
Payer: OTHER GOVERNMENT

## 2025-06-17 RX ORDER — LEVOTHYROXINE SODIUM 125 UG/1
125 TABLET ORAL
Qty: 30 TABLET | Refills: 11 | Status: SHIPPED | OUTPATIENT
Start: 2025-06-17

## 2025-06-17 NOTE — TELEPHONE ENCOUNTER
Copied from CRM #[id number]. Topic: General Inquiry - Patient Advice  >> Jun 17, 2025 12:24 PM Med Assistant Elena wrote:  Who Called: Tasia Soto    Caller is requesting assistance/information from provider's office.          Preferred Method of Contact: Phone Call  Patient's Preferred Phone Number on File: [phone]   Best Call Back Number, if different:  Additional Information: lost connection, discuss labs    Attempted to return call to patient to see if she had any further questions on labwork since we were disconnected, no answer, left message.

## 2025-06-20 PROBLEM — K21.9 GASTROESOPHAGEAL REFLUX DISEASE WITHOUT ESOPHAGITIS: Status: ACTIVE | Noted: 2025-06-20

## 2025-06-20 PROBLEM — E55.9 VITAMIN D DEFICIENCY: Status: ACTIVE | Noted: 2025-06-20

## 2025-06-20 PROBLEM — R53.83 OTHER FATIGUE: Status: ACTIVE | Noted: 2025-06-20

## 2025-06-20 NOTE — PROGRESS NOTES
ANNABELLE HOBSON   [facility]  [address] [address] [address]  [phone]      PATIENT NAME: Tasia Soto  : 1970  DATE: 25  MRN: [id number]      Billing Provider: ANNABELLE HOBSON  Level of Service: MT OFFICE/OUTPT VISIT, EST, LEVL IV, 30-39 MIN  Patient PCP Information       Provider PCP Type    ANNABELLE HOBSON General            Chief Complaint   Patient presents with    Fatigue     Patient is Tasia Soto a 53 y/o female who reports she has been feeling very tired / fatigued for the past 1-2 weeks. Patient states she did start a new job a week ago and isn't sure if it is that or thyroid issues. Patient did have thyroid surgery in March. She would also like her thyroid level checked. Patient states her  has said she has been snoring.    Sinus Problem     Patient reports for the past week she has been having some sinus issues and would like some more sinus medication. Patient states her nose goes from draining to being stuffy. She isn't sure if it is her new office or just allergens in the air. Patient has been taking Tylenol sinus with little relief.    Health Maintenance     Hepatitis C Screening- Patient declined  HIV Screening Patient Declined  TETANUS VACCINE- Patient declined  Shingles Vaccine(1 of 2) Patient Declined  Pneumococcal Vaccines (Age 50+)(1 of 1 - PCV) Patient Declined  COVID-19 Vaccine(2024- season) Patient Declined           Medications and Allergies     Medications  Outpatient Medications Marked as Taking for the 25 encounter (Office Visit) with Patrice Hunt FNP   Medication Sig Dispense Refill    albuterol (PROVENTIL/VENTOLIN HFA) 90 mcg/actuation inhaler INHALE 2 PUFFS INTO THE LUNGS EVERY 6 HOURS AS NEEDED 18 g 0    hydroCHLOROthiazide (HYDRODIURIL) 12.5 MG Tab Take 12.5 mg by mouth once daily.      ipratropium (ATROVENT) 21 mcg (0.03 %) nasal spray 2 sprays by Each Nostril route 3 (three)  times daily. 30 mL 0    sertraline (ZOLOFT) 25 MG tablet Take 25 mg by mouth once daily.      tolterodine (DETROL LA) 2 MG Cp24 Take 2 mg by mouth 2 (two) times daily.      [DISCONTINUED] cetirizine-pseudoephedrine 5-120 mg Tb12 Take 1 tablet by mouth 2 (two) times daily. As needed      [DISCONTINUED] levothyroxine (SYNTHROID) 137 MCG Tab tablet Take 137 mcg by mouth before breakfast.      [DISCONTINUED] pantoprazole (PROTONIX) 40 MG tablet Take 1 tablet (40 mg total) by mouth once daily. 90 tablet 1       Allergies  Review of patient's allergies indicates:  No Known Allergies    History of Present Illness    CHIEF COMPLAINT:  Patient presents today for fatigue    FATIGUE AND SLEEP:  She reports worsening fatigue symptoms in mid-afternoon, particularly around 2:00-2:30 PM. The onset of fatigue predates her recent job change. She reports significant snoring for several months, unable to lay supine at night due to severity. Snoring persists but is less severe when lying on her side.    DIET:  She requires two cups of coffee daily, typically consuming homemade iced coffee beverages that contain predominantly milk and cream with minimal coffee content.    MEDICATIONS:  She takes medications first thing in the morning before eating or drinking, waiting 30 minutes afterward. She reports poor compliance with vitamin D supplementation despite having a weekly formulation and placing medication in visible location as reminder.      ROS:  General: -fever, -chills, +fatigue, -weight gain, -weight loss, +sleep disturbances  Eyes: -vision changes, -redness, -discharge  ENT: -ear pain, -nasal congestion, -sore throat  Cardiovascular: -chest pain, -palpitations, -lower extremity edema  Respiratory: -cough, -shortness of breath, +snoring  Gastrointestinal: -abdominal pain, -nausea, -vomiting, -diarrhea, -constipation, -blood in stool  Genitourinary: -dysuria, -hematuria, -frequency  Musculoskeletal: -joint pain, -muscle pain  Skin:  -rash, -lesion  Neurological: -headache, -dizziness, -numbness, -tingling  Psychiatric: -anxiety, -depression, -sleep difficulty            Physical Exam  Vitals and nursing note reviewed.   Constitutional:       General: She is awake.      Appearance: Normal appearance. She is obese.   HENT:      Head: Normocephalic.      Right Ear: Tympanic membrane, ear canal and external ear normal.      Left Ear: Tympanic membrane, ear canal and external ear normal.      Nose: Nose normal.      Mouth/Throat:      Lips: Pink.      Mouth: Mucous membranes are moist.      Pharynx: Oropharynx is clear. Uvula midline.   Cardiovascular:      Rate and Rhythm: Normal rate and regular rhythm.      Heart sounds: Normal heart sounds, S1 normal and S2 normal.   Pulmonary:      Effort: Pulmonary effort is normal. No respiratory distress.      Breath sounds: Normal breath sounds. No decreased breath sounds, wheezing, rhonchi or rales.   Abdominal:      General: Bowel sounds are normal.      Palpations: Abdomen is soft.      Tenderness: There is no abdominal tenderness.   Musculoskeletal:      Cervical back: Normal range of motion.   Skin:     General: Skin is warm.      Capillary Refill: Capillary refill takes less than 2 seconds.   Neurological:      Mental Status: She is alert and oriented to person, place, and time.   Psychiatric:         Thought Content: Thought content does not include homicidal or suicidal ideation. Thought content does not include homicidal or suicidal plan.         Assessment & Plan    R53.83 Other fatigue  R06.83 Snoring  G47.30 Sleep apnea, unspecified  Z91.128 Patient's intentional underdosing of medication regimen for other reason    FATIGUE:  - Monitored patient who reports feeling tired and exhausted, especially in the mid-afternoon around 2:30 PM.  - Evaluated for potential causes including thyroid dysfunction, vitamin D deficiency, and anemia.  - Ordered thyroid level check, vitamin D level, and comprehensive  lab work.  - Weight gain was considered as a potential contributing factor.  - Will review lab results to determine next steps in diagnosis and treatment.    SNORING AND SLEEP APNEA:  - Assessed patient who reports loud snoring at night, sometimes unable to lay on back due to severity.  - Evaluated for possible sleep apnea given the reported snoring and daytime exhaustion/sleepiness.  - Weight gain was considered as a potential contributing factor to sleep issues.  - Plan to evaluate for sleep apnea if lab results are normal.    MEDICATION ADHERENCE:  - Monitored patient who reports difficulty remembering to take vitamin D medication regularly.          Health Maintenance Due   Topic Date Due    Hepatitis C Screening  Never done    HIV Screening  Never done    TETANUS VACCINE  Never done    Shingles Vaccine (1 of 2) Never done    Pneumococcal Vaccines (Age 50+) (1 of 1 - PCV) Never done    COVID-19 Vaccine (5 - 2024-25 season) 09/01/2024       Problem List Items Addressed This Visit       Maxillary sinusitis    Current Assessment & Plan   Zyrtec D RX. Medication instructions given. Reviewed pathology of current symptoms, medication side effects/risk/benefits/directions on taking medications, and worsening or persistent symptoms that require follow up in next 2-3 days. Saline/steroid nasal sprays, antihistamine use, increase fluid intake, and multivitamin/elderberry/Zinc use were recommended. May take Tylenol or Motrin as needed for pain and/or fever. Patient was instructed to take antibiotic as directed, complete entire course to avoid antibiotic resistance, and take OTC probiotic with antibiotic to prevent GI upset. Patient verbalized understanding of treatment plan and denies any questions.           Relevant Medications    cetirizine-pseudoephedrine 5-120 mg Tb12    Other fatigue    Current Assessment & Plan   Lab work today and will call with results         Relevant Orders    Iron and TIBC (Completed)     Ferritin (Completed)    CBC Auto Differential (Completed)    Vitamin D deficiency    Current Assessment & Plan   Lab work today and will call with resylts and any new orders.          Relevant Orders    Vitamin D (Completed)    Gastroesophageal reflux disease without esophagitis    Overview   Continue current medication and diet         Current Assessment & Plan   Protonix RX.   Reviewed pathology of current symptoms, medication side effects/risk/benefits/directions on taking medications, and to take medication as prescribed. Take medication as prescribed. Remain upright for at least one hour after eating or drinking, raise HOB 6-8 inches. Eat small, frequent bland meals instead of large meals. Avoid greasy/spicy/acidic foods. Avoid alcohol and NSAIDS. Monitor for changes in bowel color, texture, etc. Discussed what warrants emergent follow-up or treatment. Patient is to go to ED if they begin vomiting and it looks like blood or coffee grounds. Weight loss may help people who are overweight to reduce acid reflux. Weight loss has a number of other health benefits including decreases risk of heart disease and type 2 DM.         Relevant Medications    pantoprazole (PROTONIX) 40 MG tablet     Other Visit Diagnoses         Screening for thyroid disorder    -  Primary    Relevant Orders    TSH (Completed)    T4, Free (Completed)      Screening, lipid        Relevant Orders    Lipid Panel            Health Maintenance Topics with due status: Not Due       Topic Last Completion Date    Colorectal Cancer Screening 10/17/2022    Cervical Cancer Screening 09/05/2024    Hemoglobin A1c (Diabetic Prevention Screening) 02/12/2025    Lipid Panel 02/12/2025    Mammogram 05/20/2025    RSV Vaccine (Age 60+ and Pregnant patients) Not Due       No future appointments.     This note was generated with the assistance of ambient listening technology. Verbal consent was obtained by the patient and accompanying visitor(s) for the recording  of patient appointment to facilitate this note. I attest to having reviewed and edited the generated note for accuracy, though some syntax or spelling errors may persist. Please contact the author of this note for any clarification.     Signature:  ANNABELLE HOBSON  [facility] [address] [place] [address]  [phone]    Date of encounter: 6/13/25

## 2025-06-20 NOTE — ASSESSMENT & PLAN NOTE
Zyrtec D RX. Medication instructions given. Reviewed pathology of current symptoms, medication side effects/risk/benefits/directions on taking medications, and worsening or persistent symptoms that require follow up in next 2-3 days. Saline/steroid nasal sprays, antihistamine use, increase fluid intake, and multivitamin/elderberry/Zinc use were recommended. May take Tylenol or Motrin as needed for pain and/or fever. Patient was instructed to take antibiotic as directed, complete entire course to avoid antibiotic resistance, and take OTC probiotic with antibiotic to prevent GI upset. Patient verbalized understanding of treatment plan and denies any questions.

## 2025-06-20 NOTE — ASSESSMENT & PLAN NOTE
Protonix RX.   Reviewed pathology of current symptoms, medication side effects/risk/benefits/directions on taking medications, and to take medication as prescribed. Take medication as prescribed. Remain upright for at least one hour after eating or drinking, raise HOB 6-8 inches. Eat small, frequent bland meals instead of large meals. Avoid greasy/spicy/acidic foods. Avoid alcohol and NSAIDS. Monitor for changes in bowel color, texture, etc. Discussed what warrants emergent follow-up or treatment. Patient is to go to ED if they begin vomiting and it looks like blood or coffee grounds. Weight loss may help people who are overweight to reduce acid reflux. Weight loss has a number of other health benefits including decreases risk of heart disease and type 2 DM.

## 2025-06-24 ENCOUNTER — PATIENT MESSAGE (OUTPATIENT)
Dept: FAMILY MEDICINE | Facility: CLINIC | Age: 55
End: 2025-06-24
Payer: OTHER GOVERNMENT

## 2025-08-11 ENCOUNTER — OFFICE VISIT (OUTPATIENT)
Dept: FAMILY MEDICINE | Facility: CLINIC | Age: 55
End: 2025-08-11
Payer: OTHER GOVERNMENT

## 2025-08-11 VITALS
SYSTOLIC BLOOD PRESSURE: 124 MMHG | TEMPERATURE: 99 F | BODY MASS INDEX: 29.63 KG/M2 | HEIGHT: 70 IN | RESPIRATION RATE: 16 BRPM | DIASTOLIC BLOOD PRESSURE: 84 MMHG | WEIGHT: 207 LBS | HEART RATE: 80 BPM | OXYGEN SATURATION: 99 %

## 2025-08-11 DIAGNOSIS — R21 RASH: Primary | ICD-10-CM

## 2025-08-11 DIAGNOSIS — F32.A ANXIETY AND DEPRESSION: ICD-10-CM
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
## 2025-08-11 DIAGNOSIS — F41.9 ANXIETY AND DEPRESSION: ICD-10-CM

## 2025-08-11 DIAGNOSIS — I10 PRIMARY HYPERTENSION: ICD-10-CM

## 2025-08-11 PROCEDURE — 96372 THER/PROPH/DIAG INJ SC/IM: CPT | Mod: ,,,

## 2025-08-11 PROCEDURE — 99214 OFFICE O/P EST MOD 30 MIN: CPT | Mod: 25,,,

## 2025-08-11 RX ORDER — TRIAMCINOLONE ACETONIDE 1 MG/G
CREAM TOPICAL 2 TIMES DAILY
Qty: 45 G | Refills: 0 | Status: SHIPPED | OUTPATIENT
Start: 2025-08-11

## 2025-08-11 RX ORDER — SERTRALINE HYDROCHLORIDE 25 MG/1
25 TABLET, FILM COATED ORAL DAILY
Qty: 7 TABLET | Refills: 0 | Status: SHIPPED | OUTPATIENT
Start: 2025-08-11

## 2025-08-11 RX ORDER — MUPIROCIN 20 MG/G
OINTMENT TOPICAL 3 TIMES DAILY
Qty: 22 G | Refills: 0 | Status: SHIPPED | OUTPATIENT
Start: 2025-08-11

## 2025-08-11 RX ORDER — LEVOTHYROXINE SODIUM 112 UG/1
112 TABLET ORAL
COMMUNITY
Start: 2025-08-07

## 2025-08-11 RX ORDER — HYDROCHLOROTHIAZIDE 12.5 MG/1
12.5 TABLET ORAL DAILY
Qty: 90 TABLET | Refills: 1 | Status: SHIPPED | OUTPATIENT
Start: 2025-08-11

## 2025-08-11 RX ORDER — METHYLPREDNISOLONE 4 MG/1
TABLET ORAL
Qty: 21 TABLET | Refills: 0 | Status: SHIPPED | OUTPATIENT
Start: 2025-08-11

## 2025-08-11 RX ORDER — DEXAMETHASONE SODIUM PHOSPHATE 4 MG/ML
4 INJECTION, SOLUTION INTRA-ARTICULAR; INTRALESIONAL; INTRAMUSCULAR; INTRAVENOUS; SOFT TISSUE
Status: COMPLETED | OUTPATIENT
Start: 2025-08-11 | End: 2025-08-11

## 2025-08-11 RX ORDER — METHYLPREDNISOLONE ACETATE 40 MG/ML
40 INJECTION, SUSPENSION INTRA-ARTICULAR; INTRALESIONAL; INTRAMUSCULAR; SOFT TISSUE
Status: COMPLETED | OUTPATIENT
Start: 2025-08-11 | End: 2025-08-11

## 2025-08-11 RX ORDER — HYDROCHLOROTHIAZIDE 12.5 MG/1
12.5 TABLET ORAL DAILY
Qty: 7 TABLET | Refills: 0 | Status: SHIPPED | OUTPATIENT
Start: 2025-08-11

## 2025-08-11 RX ORDER — SERTRALINE HYDROCHLORIDE 25 MG/1
25 TABLET, FILM COATED ORAL DAILY
Qty: 90 TABLET | Refills: 1 | Status: SHIPPED | OUTPATIENT
Start: 2025-08-11

## 2025-08-11 RX ADMIN — DEXAMETHASONE SODIUM PHOSPHATE 4 MG: 4 INJECTION, SOLUTION INTRA-ARTICULAR; INTRALESIONAL; INTRAMUSCULAR; INTRAVENOUS; SOFT TISSUE at 12:08

## 2025-08-11 RX ADMIN — METHYLPREDNISOLONE ACETATE 40 MG: 40 INJECTION, SUSPENSION INTRA-ARTICULAR; INTRALESIONAL; INTRAMUSCULAR; SOFT TISSUE at 12:08
